# Patient Record
Sex: MALE | Race: WHITE | NOT HISPANIC OR LATINO
[De-identification: names, ages, dates, MRNs, and addresses within clinical notes are randomized per-mention and may not be internally consistent; named-entity substitution may affect disease eponyms.]

---

## 2018-04-22 ENCOUNTER — FORM ENCOUNTER (OUTPATIENT)
Age: 63
End: 2018-04-22

## 2018-04-23 ENCOUNTER — APPOINTMENT (OUTPATIENT)
Dept: MRI IMAGING | Facility: CLINIC | Age: 63
End: 2018-04-23
Payer: MEDICARE

## 2018-04-23 ENCOUNTER — APPOINTMENT (OUTPATIENT)
Dept: ULTRASOUND IMAGING | Facility: CLINIC | Age: 63
End: 2018-04-23
Payer: MEDICARE

## 2018-04-23 ENCOUNTER — APPOINTMENT (OUTPATIENT)
Dept: ORTHOPEDIC SURGERY | Facility: CLINIC | Age: 63
End: 2018-04-23
Payer: MEDICARE

## 2018-04-23 ENCOUNTER — OUTPATIENT (OUTPATIENT)
Dept: OUTPATIENT SERVICES | Facility: HOSPITAL | Age: 63
LOS: 1 days | End: 2018-04-23
Payer: MEDICARE

## 2018-04-23 VITALS
DIASTOLIC BLOOD PRESSURE: 84 MMHG | WEIGHT: 215 LBS | BODY MASS INDEX: 29.12 KG/M2 | SYSTOLIC BLOOD PRESSURE: 122 MMHG | HEART RATE: 78 BPM | HEIGHT: 72 IN

## 2018-04-23 DIAGNOSIS — Z80.9 FAMILY HISTORY OF MALIGNANT NEOPLASM, UNSPECIFIED: ICD-10-CM

## 2018-04-23 DIAGNOSIS — Z78.9 OTHER SPECIFIED HEALTH STATUS: ICD-10-CM

## 2018-04-23 DIAGNOSIS — Z86.39 PERSONAL HISTORY OF OTHER ENDOCRINE, NUTRITIONAL AND METABOLIC DISEASE: ICD-10-CM

## 2018-04-23 DIAGNOSIS — M25.572 PAIN IN LEFT ANKLE AND JOINTS OF LEFT FOOT: ICD-10-CM

## 2018-04-23 DIAGNOSIS — Z00.8 ENCOUNTER FOR OTHER GENERAL EXAMINATION: ICD-10-CM

## 2018-04-23 PROCEDURE — 99204 OFFICE O/P NEW MOD 45 MIN: CPT

## 2018-04-23 PROCEDURE — 73721 MRI JNT OF LWR EXTRE W/O DYE: CPT

## 2018-04-23 PROCEDURE — 93970 EXTREMITY STUDY: CPT

## 2018-04-23 PROCEDURE — 73610 X-RAY EXAM OF ANKLE: CPT | Mod: LT

## 2018-04-23 PROCEDURE — 93970 EXTREMITY STUDY: CPT | Mod: 26

## 2018-04-23 PROCEDURE — 73721 MRI JNT OF LWR EXTRE W/O DYE: CPT | Mod: 26,LT

## 2018-04-23 RX ORDER — GEMFIBROZIL 600 MG/1
600 TABLET, FILM COATED ORAL
Qty: 60 | Refills: 0 | Status: ACTIVE | COMMUNITY
Start: 2018-03-02

## 2018-04-23 RX ORDER — CEPHALEXIN 500 MG/1
500 CAPSULE ORAL 3 TIMES DAILY
Qty: 21 | Refills: 0 | Status: ACTIVE | COMMUNITY
Start: 2018-04-23 | End: 1900-01-01

## 2018-04-23 RX ORDER — OSELTAMIVIR PHOSPHATE 75 MG/1
75 CAPSULE ORAL
Qty: 10 | Refills: 0 | Status: ACTIVE | COMMUNITY
Start: 2017-12-17

## 2018-04-23 RX ORDER — LISINOPRIL AND HYDROCHLOROTHIAZIDE TABLETS 20; 12.5 MG/1; MG/1
20-12.5 TABLET ORAL
Qty: 30 | Refills: 0 | Status: ACTIVE | COMMUNITY
Start: 2018-03-03

## 2018-09-30 ENCOUNTER — EMERGENCY (EMERGENCY)
Facility: HOSPITAL | Age: 63
LOS: 0 days | Discharge: HOME | End: 2018-09-30
Attending: EMERGENCY MEDICINE | Admitting: EMERGENCY MEDICINE
Payer: MEDICARE

## 2018-09-30 VITALS
RESPIRATION RATE: 17 BRPM | OXYGEN SATURATION: 100 % | DIASTOLIC BLOOD PRESSURE: 92 MMHG | HEART RATE: 74 BPM | SYSTOLIC BLOOD PRESSURE: 165 MMHG

## 2018-09-30 VITALS — WEIGHT: 210.1 LBS | HEIGHT: 73 IN
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
## 2018-09-30 DIAGNOSIS — S63.501A UNSPECIFIED SPRAIN OF RIGHT WRIST, INITIAL ENCOUNTER: ICD-10-CM
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
## 2018-09-30 DIAGNOSIS — E78.5 HYPERLIPIDEMIA, UNSPECIFIED: ICD-10-CM

## 2018-09-30 DIAGNOSIS — I10 ESSENTIAL (PRIMARY) HYPERTENSION: ICD-10-CM

## 2018-09-30 DIAGNOSIS — L03.113 CELLULITIS OF RIGHT UPPER LIMB: ICD-10-CM

## 2018-09-30 LAB
ALBUMIN SERPL ELPH-MCNC: 3.2 G/DL — LOW (ref 3.3–5)
ALP SERPL-CCNC: 90 U/L — SIGNIFICANT CHANGE UP (ref 40–120)
ALT FLD-CCNC: 28 U/L — SIGNIFICANT CHANGE UP (ref 12–78)
ANION GAP SERPL CALC-SCNC: 8 MMOL/L — SIGNIFICANT CHANGE UP (ref 5–17)
APTT BLD: 31.4 SEC — SIGNIFICANT CHANGE UP (ref 27.5–37.4)
AST SERPL-CCNC: 33 U/L — SIGNIFICANT CHANGE UP (ref 15–37)
BASOPHILS # BLD AUTO: 0.03 K/UL — SIGNIFICANT CHANGE UP (ref 0–0.2)
BASOPHILS NFR BLD AUTO: 0.4 % — SIGNIFICANT CHANGE UP (ref 0–2)
BILIRUB SERPL-MCNC: 0.3 MG/DL — SIGNIFICANT CHANGE UP (ref 0.2–1.2)
BUN SERPL-MCNC: 14 MG/DL — SIGNIFICANT CHANGE UP (ref 7–23)
CALCIUM SERPL-MCNC: 8.7 MG/DL — SIGNIFICANT CHANGE UP (ref 8.5–10.1)
CHLORIDE SERPL-SCNC: 107 MMOL/L — SIGNIFICANT CHANGE UP (ref 96–108)
CO2 SERPL-SCNC: 25 MMOL/L — SIGNIFICANT CHANGE UP (ref 22–31)
CREAT SERPL-MCNC: 0.98 MG/DL — SIGNIFICANT CHANGE UP (ref 0.5–1.3)
EOSINOPHIL # BLD AUTO: 0.29 K/UL — SIGNIFICANT CHANGE UP (ref 0–0.5)
EOSINOPHIL NFR BLD AUTO: 3.5 % — SIGNIFICANT CHANGE UP (ref 0–6)
GLUCOSE SERPL-MCNC: 115 MG/DL — HIGH (ref 70–99)
HCT VFR BLD CALC: 41.6 % — SIGNIFICANT CHANGE UP (ref 39–50)
HGB BLD-MCNC: 14.5 G/DL — SIGNIFICANT CHANGE UP (ref 13–17)
IMM GRANULOCYTES NFR BLD AUTO: 0.7 % — SIGNIFICANT CHANGE UP (ref 0–1.5)
INR BLD: 1.03 RATIO — SIGNIFICANT CHANGE UP (ref 0.88–1.16)
LACTATE SERPL-SCNC: 1.6 MMOL/L — SIGNIFICANT CHANGE UP (ref 0.7–2)
LYMPHOCYTES # BLD AUTO: 1.13 K/UL — SIGNIFICANT CHANGE UP (ref 1–3.3)
LYMPHOCYTES # BLD AUTO: 13.5 % — SIGNIFICANT CHANGE UP (ref 13–44)
MCHC RBC-ENTMCNC: 33 PG — SIGNIFICANT CHANGE UP (ref 27–34)
MCHC RBC-ENTMCNC: 34.9 GM/DL — SIGNIFICANT CHANGE UP (ref 32–36)
MCV RBC AUTO: 94.8 FL — SIGNIFICANT CHANGE UP (ref 80–100)
MONOCYTES # BLD AUTO: 0.91 K/UL — HIGH (ref 0–0.9)
MONOCYTES NFR BLD AUTO: 10.9 % — SIGNIFICANT CHANGE UP (ref 2–14)
NEUTROPHILS # BLD AUTO: 5.92 K/UL — SIGNIFICANT CHANGE UP (ref 1.8–7.4)
NEUTROPHILS NFR BLD AUTO: 71 % — SIGNIFICANT CHANGE UP (ref 43–77)
NRBC # BLD: 0 /100 WBCS — SIGNIFICANT CHANGE UP (ref 0–0)
PLATELET # BLD AUTO: 242 K/UL — SIGNIFICANT CHANGE UP (ref 150–400)
POTASSIUM SERPL-MCNC: 4.2 MMOL/L — SIGNIFICANT CHANGE UP (ref 3.5–5.3)
POTASSIUM SERPL-SCNC: 4.2 MMOL/L — SIGNIFICANT CHANGE UP (ref 3.5–5.3)
PROT SERPL-MCNC: 7.2 GM/DL — SIGNIFICANT CHANGE UP (ref 6–8.3)
PROTHROM AB SERPL-ACNC: 11.1 SEC — SIGNIFICANT CHANGE UP (ref 9.8–12.7)
RBC # BLD: 4.39 M/UL — SIGNIFICANT CHANGE UP (ref 4.2–5.8)
RBC # FLD: 11.8 % — SIGNIFICANT CHANGE UP (ref 10.3–14.5)
SODIUM SERPL-SCNC: 140 MMOL/L — SIGNIFICANT CHANGE UP (ref 135–145)
URATE SERPL-MCNC: 8.7 MG/DL — SIGNIFICANT CHANGE UP (ref 3.4–8.8)
WBC # BLD: 8.34 K/UL — SIGNIFICANT CHANGE UP (ref 3.8–10.5)
WBC # FLD AUTO: 8.34 K/UL — SIGNIFICANT CHANGE UP (ref 3.8–10.5)

## 2018-09-30 PROCEDURE — 73110 X-RAY EXAM OF WRIST: CPT | Mod: 26,RT

## 2018-09-30 PROCEDURE — 99284 EMERGENCY DEPT VISIT MOD MDM: CPT

## 2018-09-30 RX ORDER — SODIUM CHLORIDE 9 MG/ML
1000 INJECTION INTRAMUSCULAR; INTRAVENOUS; SUBCUTANEOUS ONCE
Qty: 0 | Refills: 0 | Status: COMPLETED | OUTPATIENT
Start: 2018-09-30 | End: 2018-09-30

## 2018-09-30 RX ORDER — CEFAZOLIN SODIUM 1 G
1000 VIAL (EA) INJECTION ONCE
Qty: 0 | Refills: 0 | Status: COMPLETED | OUTPATIENT
Start: 2018-09-30 | End: 2018-09-30

## 2018-09-30 RX ORDER — CEPHALEXIN 500 MG
1 CAPSULE ORAL
Qty: 28 | Refills: 0 | OUTPATIENT
Start: 2018-09-30 | End: 2018-10-06

## 2018-09-30 RX ADMIN — SODIUM CHLORIDE 1000 MILLILITER(S): 9 INJECTION INTRAMUSCULAR; INTRAVENOUS; SUBCUTANEOUS at 08:55

## 2018-09-30 RX ADMIN — Medication 100 MILLIGRAM(S): at 08:58

## 2018-09-30 NOTE — ED ADULT NURSE NOTE - NSIMPLEMENTINTERV_GEN_ALL_ED
Implemented All Universal Safety Interventions:  [place] to call system. Call bell, personal items and telephone within reach. Instruct patient to call for assistance. Room bathroom lighting operational. Non-slip footwear when patient is off stretcher. Physically safe environment: no spills, clutter or unnecessary equipment. Stretcher in lowest position, wheels locked, appropriate side rails in place.

## 2018-09-30 NOTE — ED ADULT NURSE NOTE - OBJECTIVE STATEMENT
pt states right wrist pain, swelling and redness since yesterday worsening this morning. states he was lifting a garage door a few days prior and noticed increasing redness, swelling and pain since. no fever myalgia or discharge. denies break in skin.

## 2018-09-30 NOTE — ED PROVIDER NOTE - PROGRESS NOTE DETAILS
Attending Gonzalez, pt decline pain mediation at this time Attending Gonzalez, reviewed with pt and wife results of tests.  Plan d/c Attending Lisa, [facility] locate [facility]-precribe. Will make paper script for keflex.

## 2018-09-30 NOTE — ED PROVIDER NOTE - MEDICAL DECISION MAKING DETAILS
61 yo pt with injury to right wrist.  Pain and redness.  Possible sprain vs gout vs infection.   Plan xray and labs

## 2018-09-30 NOTE — ED PROVIDER NOTE - OBJECTIVE STATEMENT
63 yo pt presents with right wrist pain, redness and swelling.  PMH for htn, elevated cholesterol and gout.  Pt states on wed he lifted up heavy garage door.  Then on Thursday started with pain.  Now with continued pain and redness.  No fever, no travel, no sick contact.  Couple months ago pt with gout flair to right ankle and was given steroid shot.  No travel, no sick contacts.

## 2018-10-05 LAB
CULTURE RESULTS: SIGNIFICANT CHANGE UP
CULTURE RESULTS: SIGNIFICANT CHANGE UP
SPECIMEN SOURCE: SIGNIFICANT CHANGE UP
SPECIMEN SOURCE: SIGNIFICANT CHANGE UP

## 2018-11-26 NOTE — ED PROVIDER NOTE - CARE PLAN
Home Principal Discharge DX:	Wrist sprain, right, initial encounter  Secondary Diagnosis:	Cellulitis, unspecified cellulitis site

## 2018-12-12 ENCOUNTER — EMERGENCY (EMERGENCY)
Facility: HOSPITAL | Age: 63
LOS: 0 days | Discharge: HOME | End: 2018-12-13
Attending: EMERGENCY MEDICINE | Admitting: EMERGENCY MEDICINE
Payer: MEDICARE

## 2018-12-12 VITALS
HEART RATE: 87 BPM | RESPIRATION RATE: 18 BRPM | OXYGEN SATURATION: 97 % | DIASTOLIC BLOOD PRESSURE: 99 MMHG | WEIGHT: 205.91 LBS | SYSTOLIC BLOOD PRESSURE: 144 MMHG | HEIGHT: 73 IN | TEMPERATURE: 98 F

## 2018-12-12 DIAGNOSIS — R42 DIZZINESS AND GIDDINESS: ICD-10-CM
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
## 2018-12-12 DIAGNOSIS — F10.10 ALCOHOL ABUSE, UNCOMPLICATED: ICD-10-CM

## 2018-12-12 PROCEDURE — 99284 EMERGENCY DEPT VISIT MOD MDM: CPT | Mod: 25

## 2018-12-12 PROCEDURE — 70450 CT HEAD/BRAIN W/O DYE: CPT | Mod: 26
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
## 2018-12-12 NOTE — ED PROVIDER NOTE - PROGRESS NOTE DETAILS
2 negative cardiac enzymes, ct head negative dizziness likely due to alcohol intoxication will d/c janessa Paul DO

## 2018-12-12 NOTE — ED ADULT NURSE NOTE - NSIMPLEMENTINTERV_GEN_ALL_ED
Implemented All Universal Safety Interventions:  [place] to call system. Call bell, personal items and telephone within reach. Instruct patient to call for assistance. Room bathroom lighting operational. Non-slip footwear when patient is off stretcher. Physically safe environment: no spills, clutter or unnecessary equipment. Stretcher in lowest position, wheels locked, appropriate side rails in place.
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
## 2018-12-12 NOTE — ED ADULT NURSE NOTE - CHPI ED NUR SYMPTOMS NEG
no fever/no pain/no decreased eating/drinking/no tingling/no weakness/no chills/no nausea/no vomiting/no dizziness

## 2018-12-12 NOTE — ED PROVIDER NOTE - OBJECTIVE STATEMENT
64 yo male pw feeling more drunk than he usually feels when he drinks. he states he drinks daily. tonight he had a few drinks and felt dizzy and nausea, no vomiting. denies any chest pain, states he is feeling better now

## 2018-12-13 VITALS
RESPIRATION RATE: 16 BRPM | HEART RATE: 81 BPM | DIASTOLIC BLOOD PRESSURE: 78 MMHG | SYSTOLIC BLOOD PRESSURE: 139 MMHG | OXYGEN SATURATION: 99 % | TEMPERATURE: 98 F

## 2018-12-13 PROBLEM — I10 ESSENTIAL (PRIMARY) HYPERTENSION: Chronic | Status: ACTIVE | Noted: 2018-09-30

## 2018-12-13 PROBLEM — E78.00 PURE HYPERCHOLESTEROLEMIA, UNSPECIFIED: Chronic | Status: ACTIVE | Noted: 2018-09-30

## 2018-12-13 LAB
ADD ON TEST-SPECIMEN IN LAB: SIGNIFICANT CHANGE UP
ALBUMIN SERPL ELPH-MCNC: 3.1 G/DL — LOW (ref 3.3–5)
ALP SERPL-CCNC: 68 U/L — SIGNIFICANT CHANGE UP (ref 40–120)
ALT FLD-CCNC: 25 U/L — SIGNIFICANT CHANGE UP (ref 12–78)
ANION GAP SERPL CALC-SCNC: 10 MMOL/L — SIGNIFICANT CHANGE UP (ref 5–17)
AST SERPL-CCNC: 17 U/L — SIGNIFICANT CHANGE UP (ref 15–37)
BASOPHILS # BLD AUTO: 0.03 K/UL — SIGNIFICANT CHANGE UP (ref 0–0.2)
BASOPHILS NFR BLD AUTO: 0.4 % — SIGNIFICANT CHANGE UP (ref 0–2)
BILIRUB SERPL-MCNC: 0.3 MG/DL — SIGNIFICANT CHANGE UP (ref 0.2–1.2)
BUN SERPL-MCNC: 20 MG/DL — SIGNIFICANT CHANGE UP (ref 7–23)
CALCIUM SERPL-MCNC: 8.1 MG/DL — LOW (ref 8.5–10.1)
CHLORIDE SERPL-SCNC: 104 MMOL/L — SIGNIFICANT CHANGE UP (ref 96–108)
CO2 SERPL-SCNC: 26 MMOL/L — SIGNIFICANT CHANGE UP (ref 22–31)
CREAT SERPL-MCNC: 0.86 MG/DL — SIGNIFICANT CHANGE UP (ref 0.5–1.3)
EOSINOPHIL # BLD AUTO: 0.2 K/UL — SIGNIFICANT CHANGE UP (ref 0–0.5)
EOSINOPHIL NFR BLD AUTO: 2.6 % — SIGNIFICANT CHANGE UP (ref 0–6)
ETHANOL SERPL-MCNC: 141 MG/DL — HIGH (ref 0–10)
GLUCOSE SERPL-MCNC: 104 MG/DL — HIGH (ref 70–99)
HCT VFR BLD CALC: 40.8 % — SIGNIFICANT CHANGE UP (ref 39–50)
HGB BLD-MCNC: 14.3 G/DL — SIGNIFICANT CHANGE UP (ref 13–17)
IMM GRANULOCYTES NFR BLD AUTO: 2 % — HIGH (ref 0–1.5)
LYMPHOCYTES # BLD AUTO: 1.91 K/UL — SIGNIFICANT CHANGE UP (ref 1–3.3)
LYMPHOCYTES # BLD AUTO: 24.8 % — SIGNIFICANT CHANGE UP (ref 13–44)
MCHC RBC-ENTMCNC: 33.7 PG — SIGNIFICANT CHANGE UP (ref 27–34)
MCHC RBC-ENTMCNC: 35 GM/DL — SIGNIFICANT CHANGE UP (ref 32–36)
MCV RBC AUTO: 96.2 FL — SIGNIFICANT CHANGE UP (ref 80–100)
MONOCYTES # BLD AUTO: 0.7 K/UL — SIGNIFICANT CHANGE UP (ref 0–0.9)
MONOCYTES NFR BLD AUTO: 9.1 % — SIGNIFICANT CHANGE UP (ref 2–14)
NEUTROPHILS # BLD AUTO: 4.7 K/UL — SIGNIFICANT CHANGE UP (ref 1.8–7.4)
NEUTROPHILS NFR BLD AUTO: 61.1 % — SIGNIFICANT CHANGE UP (ref 43–77)
NRBC # BLD: 0 /100 WBCS — SIGNIFICANT CHANGE UP (ref 0–0)
PLATELET # BLD AUTO: 315 K/UL — SIGNIFICANT CHANGE UP (ref 150–400)
POTASSIUM SERPL-MCNC: 3.6 MMOL/L — SIGNIFICANT CHANGE UP (ref 3.5–5.3)
POTASSIUM SERPL-SCNC: 3.6 MMOL/L — SIGNIFICANT CHANGE UP (ref 3.5–5.3)
PROT SERPL-MCNC: 6.8 GM/DL — SIGNIFICANT CHANGE UP (ref 6–8.3)
RBC # BLD: 4.24 M/UL — SIGNIFICANT CHANGE UP (ref 4.2–5.8)
RBC # FLD: 12.6 % — SIGNIFICANT CHANGE UP (ref 10.3–14.5)
SODIUM SERPL-SCNC: 140 MMOL/L — SIGNIFICANT CHANGE UP (ref 135–145)
TROPONIN I SERPL-MCNC: <0.015 NG/ML — SIGNIFICANT CHANGE UP (ref 0.01–0.04)
WBC # BLD: 7.69 K/UL — SIGNIFICANT CHANGE UP (ref 3.8–10.5)
WBC # FLD AUTO: 7.69 K/UL — SIGNIFICANT CHANGE UP (ref 3.8–10.5)

## 2018-12-13 PROCEDURE — 93010 ELECTROCARDIOGRAM REPORT: CPT

## 2019-01-26 ENCOUNTER — EMERGENCY (EMERGENCY)
Facility: HOSPITAL | Age: 64
LOS: 0 days | Discharge: HOME | End: 2019-01-26
Attending: EMERGENCY MEDICINE | Admitting: EMERGENCY MEDICINE
Payer: MEDICARE

## 2019-01-26 VITALS
SYSTOLIC BLOOD PRESSURE: 136 MMHG | RESPIRATION RATE: 19 BRPM | HEART RATE: 72 BPM | OXYGEN SATURATION: 98 % | DIASTOLIC BLOOD PRESSURE: 80 MMHG

## 2019-01-26 VITALS — HEIGHT: 73 IN | WEIGHT: 205.03 LBS

## 2019-01-26 DIAGNOSIS — E78.00 PURE HYPERCHOLESTEROLEMIA, UNSPECIFIED: ICD-10-CM

## 2019-01-26 DIAGNOSIS — E78.5 HYPERLIPIDEMIA, UNSPECIFIED: ICD-10-CM

## 2019-01-26 DIAGNOSIS — M79.662 PAIN IN LEFT LOWER LEG: ICD-10-CM

## 2019-01-26 DIAGNOSIS — M10.9 GOUT, UNSPECIFIED: ICD-10-CM

## 2019-01-26 DIAGNOSIS — Z79.2 LONG TERM (CURRENT) USE OF ANTIBIOTICS: ICD-10-CM

## 2019-01-26 DIAGNOSIS — I10 ESSENTIAL (PRIMARY) HYPERTENSION: ICD-10-CM

## 2019-01-26 DIAGNOSIS — M25.462 EFFUSION, LEFT KNEE: ICD-10-CM

## 2019-01-26 PROCEDURE — 99283 EMERGENCY DEPT VISIT LOW MDM: CPT | Mod: 25

## 2019-01-26 PROCEDURE — 93971 EXTREMITY STUDY: CPT | Mod: 26,LT

## 2019-01-26 PROCEDURE — 73564 X-RAY EXAM KNEE 4 OR MORE: CPT | Mod: 26,LT

## 2019-01-26 RX ORDER — IBUPROFEN 200 MG
1 TABLET ORAL
Qty: 15 | Refills: 0 | OUTPATIENT
Start: 2019-01-26

## 2019-01-26 RX ORDER — IBUPROFEN 200 MG
600 TABLET ORAL ONCE
Qty: 0 | Refills: 0 | Status: COMPLETED | OUTPATIENT
Start: 2019-01-26 | End: 2019-01-26

## 2019-01-26 RX ADMIN — Medication 600 MILLIGRAM(S): at 13:11

## 2019-01-26 NOTE — ED ADULT TRIAGE NOTE - CHIEF COMPLAINT QUOTE
patient reports lle pain that started today, denies erythema, warmth or swelling.  did travel in car 2 days ago approx 4 hours, is not on any anticoagulants.  patient is on allopurinol for gout flare one month ago.  gout was in right hand.  patient took tylenol at 930 am, which did not help with pain

## 2019-01-26 NOTE — ED STATDOCS - OBJECTIVE STATEMENT
62 y/o male with a PMHx of HLD, HTN, Gout in the hand on allopurinol presents to the ED c/o LLE pain today. Pt states he woke up today with some pain in his LLE today and took Tylenol without relief. Pt states pain is worse when he walks, mostly around his knee. Reports a recent drive 2 days ago for 4 hours. Family at bedside states the patient is constantly driving for long periods of time all day. Reports one month ago he may have hit his knee on a rock but does not believe it is related to the pain he is having today. PMD: Dr. Larsen.

## 2019-01-26 NOTE — ED ADULT NURSE NOTE - NSIMPLEMENTINTERV_GEN_ALL_ED
Implemented All Fall with Harm Risk Interventions:  [place] to call system. Call bell, personal items and telephone within reach. Instruct patient to call for assistance. Room bathroom lighting operational. Non-slip footwear when patient is off stretcher. Physically safe environment: no spills, clutter or unnecessary equipment. Stretcher in lowest position, wheels locked, appropriate side rails in place. Provide visual cue, wrist band, yellow gown, etc. Monitor gait and stability. Monitor for mental status changes and reorient to person, place, and time. Review medications for side effects contributing to fall risk. Reinforce activity limits and safety measures with patient and family. Provide visual clues: red socks.

## 2019-01-26 NOTE — ED STATDOCS - PROGRESS NOTE DETAILS
signed Lori Holguin PA-C Pt seen initially in intake by Dr Dyer.   63M c/o left LE (though points only to knee and immediately surrounding area) pain starting this morning. Denies trauma, fever, or injury. Pt recently started on allopurinol for gout in his right hand 1 month ago. Pt notes his knee will be OK until he walks for a bit then it starts hurting worse and worse. no effusion or erythema. extensor mechanism intact. no ACL/PCL/LCL/MCL laxity or pain with stress. No TTP. Pt able to ambulate in ED unassisted with only slight limp. Plan xray, sono, advil. Pt agrees with plan of  care. signed Lori Holguin PA-C   No significant findings on xray or sono. Likely arthritis, pt has full AROM of knee. Advise RICE, motrin, f.u ortho- pt sees Dr Medina. return precautions given. Pt feeling well, pt and family agree with DC and plan of care.

## 2019-01-26 NOTE — ED STATDOCS - ATTENDING CONTRIBUTION TO CARE
I, Rupert Dyer MD,  performed the initial face to face bedside interview with this patient regarding history of present illness, review of symptoms and relevant past medical, social and family history.  I completed an independent physical examination.  I was the initial provider who evaluated this patient. I have signed out the follow up of any pending tests (i.e. labs, radiological studies) to the ACP.  I have communicated the patient’s plan of care and disposition with the ACP.

## 2019-01-26 NOTE — ED STATDOCS - NS_ ATTENDINGSCRIBEDETAILS _ED_A_ED_FT
I, Rupert Dyer MD,  performed the initial face to face bedside interview with this patient regarding history of present illness, review of symptoms and relevant past medical, social and family history.  I completed an independent physical examination.    The history, relevant review of systems, past medical and surgical history, medical decision making, and physical examination was documented by the scribe in my presence and I attest to the accuracy of the documentation.

## 2019-01-26 NOTE — ED ADULT NURSE NOTE - OBJECTIVE STATEMENT
pt presents to ED c/o LLE 6/10 that began today. pt reports recent travel in car, not on blood thinners. hx gout w/ recent gout flare up to R hand. last took tylenol at 930 AM. able to ambulate.

## 2019-10-29 NOTE — ED PROVIDER NOTE - CPE EDP ENMT NORM
Pt sedated with 1.5mg Versed and 25mcg Fentanyl for WYATT (monitored sedation from 1318 to 1343) normal...

## 2020-09-22 ENCOUNTER — TRANSCRIPTION ENCOUNTER (OUTPATIENT)
Age: 65
End: 2020-09-22

## 2021-05-14 ENCOUNTER — APPOINTMENT (OUTPATIENT)
Dept: MRI IMAGING | Facility: CLINIC | Age: 66
End: 2021-05-14
Payer: MEDICARE

## 2021-05-14 ENCOUNTER — OUTPATIENT (OUTPATIENT)
Dept: OUTPATIENT SERVICES | Facility: HOSPITAL | Age: 66
LOS: 1 days | End: 2021-05-14
Payer: MEDICARE

## 2021-05-14 DIAGNOSIS — Z00.8 ENCOUNTER FOR OTHER GENERAL EXAMINATION: ICD-10-CM

## 2021-05-14 PROCEDURE — 72148 MRI LUMBAR SPINE W/O DYE: CPT

## 2021-05-14 PROCEDURE — 72148 MRI LUMBAR SPINE W/O DYE: CPT | Mod: 26,MH

## 2021-06-14 ENCOUNTER — OUTPATIENT (OUTPATIENT)
Dept: OUTPATIENT SERVICES | Facility: HOSPITAL | Age: 66
LOS: 1 days | End: 2021-06-14
Payer: MEDICARE

## 2021-06-14 ENCOUNTER — APPOINTMENT (OUTPATIENT)
Dept: CT IMAGING | Facility: CLINIC | Age: 66
End: 2021-06-14
Payer: MEDICARE

## 2021-06-14 DIAGNOSIS — Z00.8 ENCOUNTER FOR OTHER GENERAL EXAMINATION: ICD-10-CM

## 2021-06-14 PROCEDURE — 74177 CT ABD & PELVIS W/CONTRAST: CPT

## 2021-06-14 PROCEDURE — 74177 CT ABD & PELVIS W/CONTRAST: CPT | Mod: 26,MH

## 2021-06-14 PROCEDURE — 82565 ASSAY OF CREATININE: CPT

## 2022-04-16 ENCOUNTER — TRANSCRIPTION ENCOUNTER (OUTPATIENT)
Age: 67
End: 2022-04-16

## 2022-04-28 ENCOUNTER — APPOINTMENT (OUTPATIENT)
Dept: ORTHOPEDIC SURGERY | Facility: CLINIC | Age: 67
End: 2022-04-28
Payer: MEDICARE

## 2022-04-28 ENCOUNTER — NON-APPOINTMENT (OUTPATIENT)
Age: 67
End: 2022-04-28

## 2022-04-28 DIAGNOSIS — M77.42 METATARSALGIA, LEFT FOOT: ICD-10-CM

## 2022-04-28 DIAGNOSIS — D22.9 MELANOCYTIC NEVI, UNSPECIFIED: ICD-10-CM

## 2022-04-28 DIAGNOSIS — M79.672 PAIN IN LEFT FOOT: ICD-10-CM

## 2022-04-28 PROCEDURE — 73630 X-RAY EXAM OF FOOT: CPT | Mod: 26,LT

## 2022-04-28 PROCEDURE — 99204 OFFICE O/P NEW MOD 45 MIN: CPT

## 2022-04-28 NOTE — ADDENDUM
[FreeTextEntry1] : I, Марина Hardik, acted solely as a scribe for Reji Flores PA-C on this date 04/28/2022.\par \par All medical record entries made by the Scribe were at my, Reji Flores PA-C, direction and personally dictated by me on 04/28/2022  . I have reviewed the chart and agree that the record accurately reflects my personal performance of the history, physical exam, assessment and plan. I have also personally directed, reviewed, and agreed with the chart.	\par

## 2022-04-28 NOTE — PHYSICAL EXAM
[de-identified] : Left Foot Exam\par \par General: Alert and oriented x3. In no acute distress. Pleasant in nature with a normal affect. No apparent respiratory distress.\par Erythema, Warmth, Rubor: Negative\par Swelling: Negative\par On the anterior shin there is erythema around the scab. The scab measures 1 inch in length by 0.5 inches.  No signs of infection or cellulitis\par \par ROM Ankle: Pronation of both ankles. \par 1. Dorsiflexion: 10 degrees\par 2. Plantarflexion: 40 degrees\par 3. Inversion: 20 degrees\par 4. Eversion: 10 degrees\par \par ROM of digits: Normal\par \par Pes Planus: Negative\par Pes Cavus: Negative\par \par Bunion: Negative\par Belen’s Bunion (Bunionette): Negative\par \par Hammer Toe Deformity/Deformities: Negative\par \par Tenderness to Palpation:\par 1. Heel Pain: Negative\par 2. Midfoot Pain: Negative\par 3. First MTP Joint: Negative\par 4. Lis Franc Joint: Negative\par \par Tenderness Metatarsals:\par 1st MT: Negative\par 2nd MT: Negative\par 3rd MT: Pain over the third metatarsal midshaft. \par 4th MT: Negative\par 5th MT: Negative\par Base of the 5th MT: Negative\par \par Ligament Pain:\par 1. Lis Franc Ligament: Negative\par 2. Plantar Fascia Ligament: Negative\par \par Strength:\par 5/5 TA/GS/EHL/FHL/EDL/ADD/ABD\par \par Pulses: 2+ DP/PT Pulses\par \par Capillary Refill Toes: <2 seconds\par \par Neuro: Intact motor and sensory throughout\par \par Additional Test:\par 1. Brewer's Squeeze Test: Negative\par 2. Calcaneal Squeeze Test: Negative\par \par Black "mole" with asymmetrical borders dorsal aspect of the foot, ~[id number]. [de-identified] : 3V of the left foot were ordered, obtained, and reviewed by me today, 04/28/2022, revealed: Hallux Rigidus. Heel spurs noted. Bone spur coming off the distal first metatarsal just proximal to the first MTPJ. Calcifications of the vessels to the anterior ankle noted on lateral views x-ray.

## 2022-04-28 NOTE — DISCUSSION/SUMMARY
[de-identified] : Today I had a lengthy discussion with the patient regarding their left foot pain. I have addressed all the patient's concerns surrounding the pathology of their condition. XR imaging was completed in office today and results were reviewed with the patient. At this time I would like to obtain advanced imaging of the patient's left foot. An MRI was ordered so I can find out more about the etiology of the patient's condition. \par \par In the interim, I recommended that the patient utilize a CAM boot. The patient was fitted for the CAM boot in the office today. The patient was educated about the boot wear pattern and utilization, as well as the timeframe to come out of the boot. He was also given full instructions for using the boot. He does not need to wear the boot when at rest and when sleeping. I recommend that the patient utilize meloxicam with food once per day as instructed. A prescription for the meloxicam was ordered for the patient in the office today. He may utilize ice and heat PRN. They can also elevate their left foot above the level of the heart. We discussed possible utilization of over the counter Dr. Novak's gel work orthotics once the patient transitions out of the CAM boot. 		\par \par For the small mole on the dorsal aspect of his foot, I recommended that he follow up with dermatology for further evaluation given his clinical examination. The patient understood and verbally agreed to the treatment plan. All of their questions were answered and they were satisfied with the visit. The patient should call the office if they have any questions or experience worsening symptoms. The patient should follow up with the office via Telehealth after obtaining the MRI.

## 2022-04-28 NOTE — HISTORY OF PRESENT ILLNESS
[FreeTextEntry1] : The patient is a 66 year old male presenting for an initial evaluation of left foot pain x one month. The patient reports that he has tried a pair of half-custom hard orthotics as recommended by a Chiropractor for one day. He reports that he maintained his normal routine for that day, however at the end of day, he noted an onset of severe, sharp pain to the front of his left foot. He is here today due to continual pain that has not improved over the past month, with a constant timing. He works long hours standing and walking, reporting sharp pain with walking. The patient states that he is unable to walk due to pain in his left foot. He is currently working, however he states that he took a few days off due to left foot pain. For pain, he takes Advil PRN. Of note, the patient does have an abrasion injury present on his anterior shin due to stepping off a curb. He presents today wearing sneakers and is walking without assistance. No other complaints.

## 2022-05-10 ENCOUNTER — NON-APPOINTMENT (OUTPATIENT)
Age: 67
End: 2022-05-10

## 2022-05-11 ENCOUNTER — TRANSCRIPTION ENCOUNTER (OUTPATIENT)
Age: 67
End: 2022-05-11

## 2022-05-12 ENCOUNTER — OUTPATIENT (OUTPATIENT)
Dept: OUTPATIENT SERVICES | Facility: HOSPITAL | Age: 67
LOS: 1 days | End: 2022-05-12

## 2022-05-12 ENCOUNTER — APPOINTMENT (OUTPATIENT)
Dept: DISASTER EMERGENCY | Facility: HOSPITAL | Age: 67
End: 2022-05-12

## 2022-05-12 VITALS
HEART RATE: 92 BPM | HEIGHT: 76 IN | RESPIRATION RATE: 18 BRPM | WEIGHT: 194.01 LBS | OXYGEN SATURATION: 97 % | SYSTOLIC BLOOD PRESSURE: 135 MMHG | DIASTOLIC BLOOD PRESSURE: 82 MMHG | TEMPERATURE: 99 F

## 2022-05-12 VITALS — TEMPERATURE: 101 F

## 2022-05-12 DIAGNOSIS — U07.1 COVID-19: ICD-10-CM

## 2022-05-12 RX ORDER — BEBTELOVIMAB 87.5 MG/ML
175 INJECTION, SOLUTION INTRAVENOUS ONCE
Refills: 0 | Status: COMPLETED | OUTPATIENT
Start: 2022-05-12 | End: 2022-05-12

## 2022-05-12 RX ORDER — ACETAMINOPHEN 500 MG
650 TABLET ORAL ONCE
Refills: 0 | Status: COMPLETED | OUTPATIENT
Start: 2022-05-12 | End: 2022-05-12

## 2022-05-12 RX ADMIN — BEBTELOVIMAB 175 MILLIGRAM(S): 87.5 INJECTION, SOLUTION INTRAVENOUS at 11:43

## 2022-05-12 RX ADMIN — Medication 650 MILLIGRAM(S): at 12:58

## 2022-05-12 NOTE — CHART NOTE - NSCHARTNOTEFT_GEN_A_CORE
CC: Monoclonal Antibody Infusion/COVID 19 Positive  66yMale with PMHx HTN, HLD and symptoms cough, congestion malaise    exam/findings:  T(C): --  HR: 92 (05-12-22 @ 11:35) (92 - 92)  BP: 135/82 (05-12-22 @ 11:35) (135/82 - 135/82)  RR: 18 (05-12-22 @ 11:35) (18 - 18)  SpO2: 97% (05-12-22 @ 11:35) (97% - 97%)      PE:   Appearance: NAD	  HEENT:   Normal cephalic atraumatic  Lymphatic: No lymphadenopathy  Respiratory: equal rise and fall of chest	  Skin: warm and dry  Neurologic: Non-focal  Extremities: Normal range of motion    ASSESSMENT:  Pt is a 66y with PMHx of HTN/HLD, Covid 19 Positive with symptoms in the last 7 days, who presents to infusion center for Monoclonal antibody infusion Bebtelovimab  Symptoms/ Criteria: cough congestion malaise  Risk Profile includes: Age >65, CV dz    PLAN:  - infusion procedure explained to patient   -Consent for monoclonal antibody infusion obtained   -Risk & benefits discussed/all questions answered  -Bebtelovimab  175mg IV push   -observe patient for one hour post infusion      I have reviewed the Bebtelovimab Emergency Use Authorization (EAU) and I have provided the patient or patient's caregiver with the following information:  1. FDA has authorized emergency use of Bebtelovimab , which is not FDA-approved biologic product.  2. The patient or patient's caregiver has the option to accept or refuse administration of Bebtelovimab  3. The significant known and benefits are unknown.  4. Information on available alternative treatments and risks and benefits of those alternatives.    Discharge:  Patient tolerated infusion well denies complaints of chest pain/SOB/dizziness/palpitations  Vital signs stable  D/C instructions given/ fact sheet included.  Patient to follow-up with PCP as needed.

## 2022-05-19 ENCOUNTER — APPOINTMENT (OUTPATIENT)
Dept: ORTHOPEDIC SURGERY | Facility: CLINIC | Age: 67
End: 2022-05-19

## 2022-06-10 ENCOUNTER — RX RENEWAL (OUTPATIENT)
Age: 67
End: 2022-06-10

## 2022-06-10 RX ORDER — MELOXICAM 15 MG/1
15 TABLET ORAL DAILY
Qty: 30 | Refills: 0 | Status: ACTIVE | COMMUNITY
Start: 2022-04-28 | End: 1900-01-01

## 2023-09-10 ENCOUNTER — EMERGENCY (EMERGENCY)
Facility: HOSPITAL | Age: 68
LOS: 0 days | Discharge: HOME | End: 2023-09-10
Attending: STUDENT IN AN ORGANIZED HEALTH CARE EDUCATION/TRAINING PROGRAM
Payer: MEDICARE

## 2023-09-10 VITALS
HEART RATE: 60 BPM | SYSTOLIC BLOOD PRESSURE: 134 MMHG | DIASTOLIC BLOOD PRESSURE: 84 MMHG | OXYGEN SATURATION: 95 % | WEIGHT: 190.04 LBS | TEMPERATURE: 98 F | HEIGHT: 72 IN | RESPIRATION RATE: 18 BRPM

## 2023-09-10 VITALS
HEART RATE: 65 BPM | OXYGEN SATURATION: 100 % | RESPIRATION RATE: 16 BRPM | TEMPERATURE: 98 F | DIASTOLIC BLOOD PRESSURE: 82 MMHG | SYSTOLIC BLOOD PRESSURE: 154 MMHG

## 2023-09-10 DIAGNOSIS — R42 DIZZINESS AND GIDDINESS: ICD-10-CM

## 2023-09-10 DIAGNOSIS — M48.00 SPINAL STENOSIS, SITE UNSPECIFIED: ICD-10-CM

## 2023-09-10 DIAGNOSIS — I10 ESSENTIAL (PRIMARY) HYPERTENSION: ICD-10-CM

## 2023-09-10 DIAGNOSIS — I45.10 UNSPECIFIED RIGHT BUNDLE-BRANCH BLOCK: ICD-10-CM

## 2023-09-10 DIAGNOSIS — E78.00 PURE HYPERCHOLESTEROLEMIA, UNSPECIFIED: ICD-10-CM

## 2023-09-10 LAB
ALBUMIN SERPL ELPH-MCNC: 3.5 G/DL — SIGNIFICANT CHANGE UP (ref 3.3–5)
ALP SERPL-CCNC: 66 U/L — SIGNIFICANT CHANGE UP (ref 40–120)
ALT FLD-CCNC: 22 U/L — SIGNIFICANT CHANGE UP (ref 12–78)
ANION GAP SERPL CALC-SCNC: 3 MMOL/L — LOW (ref 5–17)
AST SERPL-CCNC: 26 U/L — SIGNIFICANT CHANGE UP (ref 15–37)
BASOPHILS # BLD AUTO: 0.05 K/UL — SIGNIFICANT CHANGE UP (ref 0–0.2)
BASOPHILS NFR BLD AUTO: 0.8 % — SIGNIFICANT CHANGE UP (ref 0–2)
BILIRUB SERPL-MCNC: 0.4 MG/DL — SIGNIFICANT CHANGE UP (ref 0.2–1.2)
BUN SERPL-MCNC: 18 MG/DL — SIGNIFICANT CHANGE UP (ref 7–23)
CALCIUM SERPL-MCNC: 8.5 MG/DL — SIGNIFICANT CHANGE UP (ref 8.5–10.1)
CHLORIDE SERPL-SCNC: 112 MMOL/L — HIGH (ref 96–108)
CO2 SERPL-SCNC: 27 MMOL/L — SIGNIFICANT CHANGE UP (ref 22–31)
CREAT SERPL-MCNC: 0.99 MG/DL — SIGNIFICANT CHANGE UP (ref 0.5–1.3)
EGFR: 83 ML/MIN/1.73M2 — SIGNIFICANT CHANGE UP
EOSINOPHIL # BLD AUTO: 0.48 K/UL — SIGNIFICANT CHANGE UP (ref 0–0.5)
EOSINOPHIL NFR BLD AUTO: 7.4 % — HIGH (ref 0–6)
GLUCOSE SERPL-MCNC: 95 MG/DL — SIGNIFICANT CHANGE UP (ref 70–99)
HCT VFR BLD CALC: 41.5 % — SIGNIFICANT CHANGE UP (ref 39–50)
HGB BLD-MCNC: 14.3 G/DL — SIGNIFICANT CHANGE UP (ref 13–17)
IMM GRANULOCYTES NFR BLD AUTO: 0.3 % — SIGNIFICANT CHANGE UP (ref 0–0.9)
LYMPHOCYTES # BLD AUTO: 1.18 K/UL — SIGNIFICANT CHANGE UP (ref 1–3.3)
LYMPHOCYTES # BLD AUTO: 18.2 % — SIGNIFICANT CHANGE UP (ref 13–44)
MCHC RBC-ENTMCNC: 33.1 PG — SIGNIFICANT CHANGE UP (ref 27–34)
MCHC RBC-ENTMCNC: 34.5 GM/DL — SIGNIFICANT CHANGE UP (ref 32–36)
MCV RBC AUTO: 96.1 FL — SIGNIFICANT CHANGE UP (ref 80–100)
MONOCYTES # BLD AUTO: 0.73 K/UL — SIGNIFICANT CHANGE UP (ref 0–0.9)
MONOCYTES NFR BLD AUTO: 11.3 % — SIGNIFICANT CHANGE UP (ref 2–14)
NEUTROPHILS # BLD AUTO: 4.01 K/UL — SIGNIFICANT CHANGE UP (ref 1.8–7.4)
NEUTROPHILS NFR BLD AUTO: 62 % — SIGNIFICANT CHANGE UP (ref 43–77)
NT-PROBNP SERPL-SCNC: 43 PG/ML — SIGNIFICANT CHANGE UP (ref 0–125)
PLATELET # BLD AUTO: 269 K/UL — SIGNIFICANT CHANGE UP (ref 150–400)
POTASSIUM SERPL-MCNC: 4.3 MMOL/L — SIGNIFICANT CHANGE UP (ref 3.5–5.3)
POTASSIUM SERPL-SCNC: 4.3 MMOL/L — SIGNIFICANT CHANGE UP (ref 3.5–5.3)
PROT SERPL-MCNC: 6.4 GM/DL — SIGNIFICANT CHANGE UP (ref 6–8.3)
RBC # BLD: 4.32 M/UL — SIGNIFICANT CHANGE UP (ref 4.2–5.8)
RBC # FLD: 13.3 % — SIGNIFICANT CHANGE UP (ref 10.3–14.5)
SODIUM SERPL-SCNC: 142 MMOL/L — SIGNIFICANT CHANGE UP (ref 135–145)
TROPONIN I, HIGH SENSITIVITY RESULT: 10.03 NG/L — SIGNIFICANT CHANGE UP
WBC # BLD: 6.47 K/UL — SIGNIFICANT CHANGE UP (ref 3.8–10.5)
WBC # FLD AUTO: 6.47 K/UL — SIGNIFICANT CHANGE UP (ref 3.8–10.5)

## 2023-09-10 PROCEDURE — 99285 EMERGENCY DEPT VISIT HI MDM: CPT | Mod: 25

## 2023-09-10 PROCEDURE — 93010 ELECTROCARDIOGRAM REPORT: CPT

## 2023-09-10 PROCEDURE — 70450 CT HEAD/BRAIN W/O DYE: CPT | Mod: 26,MA

## 2023-09-10 PROCEDURE — 85025 COMPLETE CBC W/AUTO DIFF WBC: CPT

## 2023-09-10 PROCEDURE — 99285 EMERGENCY DEPT VISIT HI MDM: CPT

## 2023-09-10 PROCEDURE — 36415 COLL VENOUS BLD VENIPUNCTURE: CPT

## 2023-09-10 PROCEDURE — 70450 CT HEAD/BRAIN W/O DYE: CPT | Mod: MA

## 2023-09-10 PROCEDURE — 80053 COMPREHEN METABOLIC PANEL: CPT

## 2023-09-10 PROCEDURE — 83880 ASSAY OF NATRIURETIC PEPTIDE: CPT

## 2023-09-10 PROCEDURE — 93005 ELECTROCARDIOGRAM TRACING: CPT

## 2023-09-10 PROCEDURE — 71045 X-RAY EXAM CHEST 1 VIEW: CPT | Mod: 26

## 2023-09-10 PROCEDURE — 71045 X-RAY EXAM CHEST 1 VIEW: CPT

## 2023-09-10 PROCEDURE — 84484 ASSAY OF TROPONIN QUANT: CPT
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
## 2023-09-10 NOTE — ED PROVIDER NOTE - CLINICAL SUMMARY MEDICAL DECISION MAKING FREE TEXT BOX
66 y/o male here for episode of lightheadedness this morning. reported not eating or drinking anything prior to the episode. no neuro deficit. symptoms resolved at present. will r/o any intracranial hemorrhage vs electrolyte abnormality vs hypoglycemia vs ACS vs arrhythmia's. Plan to do labs, ekg, imaging. meds and reassess.

## 2023-09-10 NOTE — ED ADULT TRIAGE NOTE - HEIGHT IN FEET
Problem: Patient Care Overview (Adult)  Goal: Plan of Care Review  Outcome: Ongoing (interventions implemented as appropriate)    08/04/17 9104   Coping/Psychosocial Response Interventions   Plan Of Care Reviewed With patient;daughter   Outcome Evaluation   Outcome Summary/Follow up Plan continue to monitor hgb.        Goal: Adult Individualization and Mutuality  Outcome: Ongoing (interventions implemented as appropriate)  Goal: Discharge Needs Assessment  Outcome: Ongoing (interventions implemented as appropriate)    Problem: Respiratory Insufficiency (Adult)  Goal: Identify Related Risk Factors and Signs and Symptoms  Outcome: Ongoing (interventions implemented as appropriate)  Goal: Effective Ventilation  Outcome: Ongoing (interventions implemented as appropriate)       6

## 2023-09-10 NOTE — ED PROVIDER NOTE - PATIENT PORTAL LINK FT
You can access the FollowMyHealth Patient Portal offered by [facility] by registering at the following website: [email or web address]. By joining [employer]’s FollowMyHealth portal, you will also be able to view your health information using other applications (apps) compatible with our system.

## 2023-09-10 NOTE — ED PROVIDER NOTE - NS_ ATTENDINGSCRIBEDETAILS _ED_A_ED_FT
I, Monico Verdin DO,  performed the initial face to face bedside interview with this patient regarding history of present illness, review of symptoms and relevant past medical, social and family history.  I completed an independent physical examination.  I was the initial provider who evaluated this patient.   I personally saw the patient and performed a substantive portion of the visit including all aspects of the medical decision making.  The history, relevant review of systems, past medical and surgical history, medical decision making, and physical examination was documented by the scribe in my presence and I attest to the accuracy of the documentation.

## 2023-09-10 NOTE — ED PROVIDER NOTE - NS ED ROS FT
General: No fever, no nausea, no vomiting  HEENT: No loc, no ha, no dizziness  Respiratory: no trouble breathing  Cardiovascular: No chest pain  GI: No abdominal pain, no diarrhea  : No urinary complaints  Endocrine: no generalized weakness  Neurological: No weakness, numbness +lightheadedness  MSK: no recent trauma

## 2023-09-10 NOTE — ED PROVIDER NOTE - NSFOLLOWUPINSTRUCTIONS_ED_ALL_ED_FT
Patient advised to follow up with PMD for follow up in 3-4 days.  patient and his wife understands and agrees with plan.  Return to ER if symptoms worsens or develop new symptoms.       Dizziness  Dizziness is a common problem. It is a feeling of unsteadiness or light-headedness. You may feel like you are about to faint. Dizziness can lead to injury if you stumble or fall. Anyone can become dizzy, but dizziness is more common in older adults. This condition can be caused by a number of things, including medicines, dehydration, or illness.    Follow these instructions at home:  Eating and drinking    A comparison of three sample cups showing dark yellow, yellow, and pale yellow urine.  Drink enough fluid to keep your urine pale yellow. This helps to keep you from becoming dehydrated. Try to drink more clear fluids, such as water.  Do not drink alcohol.  Limit your caffeine intake if told to do so by your health care provider. Check ingredients and nutrition facts to see if a food or beverage contains caffeine.  Limit your salt (sodium) intake if told to do so by your health care provider. Check ingredients and nutrition facts to see if a food or beverage contains sodium.  Activity    A sign showing that a person should not drive.  Avoid making quick movements.  Rise slowly from chairs and steady yourself until you feel okay.  In the morning, first sit up on the side of the bed. When you feel okay, stand slowly while you hold onto something until you know that your balance is good.  If you need to  one place for a long time, move your legs often. Tighten and relax the muscles in your legs while you are standing.  Do not drive or use machinery if you feel dizzy.  Avoid bending down if you feel dizzy. Place items in your home so that they are easy for you to reach without leaning over.  Lifestyle    Do not use any products that contain nicotine or tobacco. These products include cigarettes, chewing tobacco, and vaping devices, such as e-cigarettes. If you need help quitting, ask your health care provider.  Try to reduce your stress level by using methods such as yoga or meditation. Talk with your health care provider if you need help to manage your stress.  General instructions    Watch your dizziness for any changes.  Take over-the-counter and prescription medicines only as told by your health care provider. Talk with your health care provider if you think that your dizziness is caused by a medicine that you are taking.  Tell a friend or a family member that you are feeling dizzy. If he or she notices any changes in your behavior, have this person call your health care provider.  Keep all follow-up visits. This is important.  Contact a health care provider if:  Your dizziness does not go away or you have new symptoms.  Your dizziness or light-headedness gets worse.  You feel nauseous.  You have reduced hearing.  You have a fever.  You have neck pain or a stiff neck.  Your dizziness leads to an injury or a fall.  Get help right away if:  You vomit or have diarrhea and are unable to eat or drink anything.  You have problems talking, walking, swallowing, or using your arms, hands, or legs.  You feel generally weak.  You have any bleeding.  You are not thinking clearly or you have trouble forming sentences. It may take a friend or family member to notice this.  You have chest pain, abdominal pain, shortness of breath, or sweating.  Your vision changes or you develop a severe headache.  These symptoms may represent a serious problem that is an emergency. Do not wait to see if the symptoms will go away. Get medical help right away. Call your local emergency services (911 in the [place] Do not drive yourself to the hospital.    Summary  Dizziness is a feeling of unsteadiness or light-headedness. This condition can be caused by a number of things, including medicines, dehydration, or illness.  Anyone can become dizzy, but dizziness is more common in older adults.  Drink enough fluid to keep your urine pale yellow. Do not drink alcohol.  Avoid making quick movements if you feel dizzy. Monitor your dizziness for any changes.  This information is not intended to replace advice given to you by your health care provider. Make sure you discuss any questions you have with your health care provider.

## 2023-09-10 NOTE — ED ADULT NURSE NOTE - CHPI ED NUR SYMPTOMS NEG
no change in level of consciousness/no confusion/no dizziness/no fever/no loss of consciousness/no nausea/no vomiting

## 2023-09-10 NOTE — ED PROVIDER NOTE - OBJECTIVE STATEMENT
68 y/o male w/PMHx of HLD on Lisinopril and HTN presents to the ED BIBEMS from home for evaluation. Pt states he had a few alcoholic drinks last night, but woke up today and felt lightheaded when outside. denies LOC or fall. States lightheadedness has subsided PTA at ED. 66 y/o male w/PMHx of HLD on Lisinopril, HTN, and spinal stenosis presents to the ED BIBEMS from home for evaluation. Pt states he had several alcoholic drinks last night with no problems. Pt woke up today at 9 am and began to do chores. He didn't eat or drink anything this morning before doing chores. He was bringing boxes outside when he started to feel lightheaded and lose balance. Pt states they felt better after sitting down. denying CP, HA, dizziness, n/v, blurry vision, abd pain, fever, chills. EKG by EMS showing ectopic beats. EKG at ED not showing any signs of ectopy. denies LOC or fall. States episode of dizziness has subsided PTA at ED.

## 2023-09-10 NOTE — ED PROVIDER NOTE - PROGRESS NOTE DETAILS
Patient reevaluated at bedside states he feels better.  Denies any dizziness or chest pain at present.  Labs and CT with no acute pathology.  Discussed results with the patient.    Lightheadedness likely secondary to hypoglycemia versus alcohol intake overnight. Will discharge with PMD follow-up.  Red flags discussed.  Return precautions given.

## 2023-10-09 ENCOUNTER — APPOINTMENT (OUTPATIENT)
Dept: ORTHOPEDIC SURGERY | Facility: CLINIC | Age: 68
End: 2023-10-09

## 2024-06-21 ENCOUNTER — NON-APPOINTMENT (OUTPATIENT)
Age: 69
End: 2024-06-21

## 2024-12-20 NOTE — ED ADULT NURSE NOTE - NS ED NURSE LEVEL OF CONSCIOUSNESS ORIENTATION
Youtube \"[address]\"     Youtube \"Mini walk Citizen of [place]\"    Oriented - self; Oriented - place; Oriented - time

## 2025-04-15 ENCOUNTER — EMERGENCY (EMERGENCY)
Facility: HOSPITAL | Age: 70
LOS: 0 days | Discharge: HOME | End: 2025-04-15
Attending: EMERGENCY MEDICINE
Payer: MEDICARE

## 2025-04-15 VITALS
TEMPERATURE: 98 F | DIASTOLIC BLOOD PRESSURE: 72 MMHG | OXYGEN SATURATION: 97 % | SYSTOLIC BLOOD PRESSURE: 127 MMHG | RESPIRATION RATE: 17 BRPM | HEART RATE: 75 BPM

## 2025-04-15 VITALS — WEIGHT: 199.96 LBS

## 2025-04-15 DIAGNOSIS — S73.101A UNSPECIFIED SPRAIN OF RIGHT HIP, INITIAL ENCOUNTER: ICD-10-CM
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
## 2025-04-15 DIAGNOSIS — I10 ESSENTIAL (PRIMARY) HYPERTENSION: ICD-10-CM

## 2025-04-15 DIAGNOSIS — E78.00 PURE HYPERCHOLESTEROLEMIA, UNSPECIFIED: ICD-10-CM
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
## 2025-04-15 DIAGNOSIS — M25.551 PAIN IN RIGHT HIP: ICD-10-CM

## 2025-04-15 PROCEDURE — 99284 EMERGENCY DEPT VISIT MOD MDM: CPT | Mod: FS

## 2025-04-15 PROCEDURE — 73502 X-RAY EXAM HIP UNI 2-3 VIEWS: CPT | Mod: 26,RT

## 2025-04-15 PROCEDURE — 99283 EMERGENCY DEPT VISIT LOW MDM: CPT | Mod: 25

## 2025-04-15 PROCEDURE — 73502 X-RAY EXAM HIP UNI 2-3 VIEWS: CPT | Mod: RT

## 2025-04-15 RX ORDER — IBUPROFEN 200 MG
600 TABLET ORAL ONCE
Refills: 0 | Status: COMPLETED | OUTPATIENT
Start: 2025-04-15 | End: 2025-04-15

## 2025-04-15 NOTE — ED ADULT NURSE REASSESSMENT NOTE - NS ED NURSE REASSESS COMMENT FT1
Pt seen, treated and discharged by VANESSA Basilio. Pt did not receive medication. As per VANESSA Basilio, pt did not want medication prior to d/c.

## 2025-04-15 NOTE — ED STATDOCS - ATTENDING APP SHARED VISIT CONTRIBUTION OF CARE
I personally saw the patient with the ROSEY, and completed the key components of the history and physical exam. I then discussed the management plan with the ROSEY.

## 2025-04-15 NOTE — ED ADULT TRIAGE NOTE - PATIENT'S PREFERRED PRONOUN
LM on VM to call back. Needs follow up office visit.     Ping CH RN, BSN, PHN  Denver Flex RN     Him/He

## 2025-04-15 NOTE — ED STATDOCS - OBJECTIVE STATEMENT
70 y/o M with PMHX of HTN, high cholesterol presents to ED c/o RLE hip pain after working yesterday and picking up a hose at 4pm, progressively worsening. No trauma/falls. Endorses difficulty ambulating.

## 2025-04-15 NOTE — ED STATDOCS - MUSCULOSKELETAL, MLM
full ROM hip, no bony tenderness in hip or lower back, motor 5/5 b/l LE, able to stand and take a few steps

## 2025-04-15 NOTE — ED STATDOCS - PATIENT PORTAL LINK FT
You can access the FollowMyHealth Patient Portal offered by [facility] by registering at the following website: [email or web address]. By joining [employer]’s FollowMyHealth portal, you will also be able to view your health information using other applications (apps) compatible with our system.

## 2025-04-15 NOTE — ED STATDOCS - CLINICAL SUMMARY MEDICAL DECISION MAKING FREE TEXT BOX
68 y/o M with RLE hip pain. No falls. Will XR. Likely musculoskeletal pain. Treat with anti inflammatories, muscle relaxers and ortho f/u.

## 2025-04-15 NOTE — ED ADULT TRIAGE NOTE - CHIEF COMPLAINT QUOTE
Patient presents to the ER with complaints of right groin and hip pain after working yesterday. Patient reports difficulty ambulating. Denies injury, numbness/tingling.

## 2025-04-15 NOTE — ED STATDOCS - PROGRESS NOTE DETAILS
XR negative for fx, will dc home with RICE, ortho f/u return precautions given pt agreeable to dc and plan of care  Stefani Nation PA-C Patient seen and evaluated, ED attending note and orders reviewed, will continue with patient follow up and care -Stefani Nation PA-C

## 2025-04-17 ENCOUNTER — APPOINTMENT (OUTPATIENT)
Dept: ORTHOPEDIC SURGERY | Facility: CLINIC | Age: 70
End: 2025-04-17
Payer: MEDICARE

## 2025-04-17 DIAGNOSIS — M17.0 BILATERAL PRIMARY OSTEOARTHRITIS OF KNEE: ICD-10-CM

## 2025-04-17 PROCEDURE — 20610 DRAIN/INJ JOINT/BURSA W/O US: CPT | Mod: RT

## 2025-04-17 PROCEDURE — 99203 OFFICE O/P NEW LOW 30 MIN: CPT | Mod: 25

## 2025-04-17 PROCEDURE — 73560 X-RAY EXAM OF KNEE 1 OR 2: CPT | Mod: 50

## 2025-04-18 PROBLEM — M17.0 PRIMARY OSTEOARTHRITIS OF BOTH KNEES: Status: ACTIVE | Noted: 2025-04-18

## 2025-04-22 VITALS — HEIGHT: 72 IN | BODY MASS INDEX: 29.12 KG/M2 | WEIGHT: 215 LBS

## 2025-04-24 ENCOUNTER — APPOINTMENT (OUTPATIENT)
Dept: ORTHOPEDIC SURGERY | Facility: CLINIC | Age: 70
End: 2025-04-24

## 2025-04-24 VITALS — BODY MASS INDEX: 26.51 KG/M2 | HEIGHT: 73 IN | WEIGHT: 200 LBS

## 2025-04-24 DIAGNOSIS — M17.12 UNILATERAL PRIMARY OSTEOARTHRITIS, LEFT KNEE: ICD-10-CM

## 2025-04-24 PROCEDURE — 20610 DRAIN/INJ JOINT/BURSA W/O US: CPT | Mod: LT

## 2025-04-24 PROCEDURE — 99213 OFFICE O/P EST LOW 20 MIN: CPT | Mod: 25

## 2025-05-01 PROBLEM — Z78.9 NON-SMOKER: Status: ACTIVE | Noted: 2025-05-01

## 2025-05-02 ENCOUNTER — INPATIENT (INPATIENT)
Facility: HOSPITAL | Age: 70
LOS: 3 days | Discharge: HOME | DRG: 378 | End: 2025-05-06
Attending: STUDENT IN AN ORGANIZED HEALTH CARE EDUCATION/TRAINING PROGRAM | Admitting: STUDENT IN AN ORGANIZED HEALTH CARE EDUCATION/TRAINING PROGRAM
Payer: MEDICARE

## 2025-05-02 VITALS — HEIGHT: 72 IN

## 2025-05-02 DIAGNOSIS — K92.2 GASTROINTESTINAL HEMORRHAGE, UNSPECIFIED: ICD-10-CM

## 2025-05-02 LAB
ALBUMIN SERPL ELPH-MCNC: 3 G/DL — LOW (ref 3.3–5)
ALP SERPL-CCNC: 67 U/L — SIGNIFICANT CHANGE UP (ref 40–120)
ALT FLD-CCNC: 21 U/L — SIGNIFICANT CHANGE UP (ref 12–78)
ANION GAP SERPL CALC-SCNC: 4 MMOL/L — LOW (ref 5–17)
APTT BLD: 25.5 SEC — LOW (ref 26.1–36.8)
AST SERPL-CCNC: 22 U/L — SIGNIFICANT CHANGE UP (ref 15–37)
BASOPHILS # BLD AUTO: 0.05 K/UL — SIGNIFICANT CHANGE UP (ref 0–0.2)
BASOPHILS # BLD AUTO: 0.06 K/UL — SIGNIFICANT CHANGE UP (ref 0–0.2)
BASOPHILS NFR BLD AUTO: 0.4 % — SIGNIFICANT CHANGE UP (ref 0–2)
BASOPHILS NFR BLD AUTO: 0.4 % — SIGNIFICANT CHANGE UP (ref 0–2)
BILIRUB SERPL-MCNC: 0.5 MG/DL — SIGNIFICANT CHANGE UP (ref 0.2–1.2)
BLD GP AB SCN SERPL QL: SIGNIFICANT CHANGE UP
BUN SERPL-MCNC: 19 MG/DL — SIGNIFICANT CHANGE UP (ref 7–23)
CALCIUM SERPL-MCNC: 8.5 MG/DL — SIGNIFICANT CHANGE UP (ref 8.5–10.1)
CHLORIDE SERPL-SCNC: 110 MMOL/L — HIGH (ref 96–108)
CO2 SERPL-SCNC: 24 MMOL/L — SIGNIFICANT CHANGE UP (ref 22–31)
CREAT SERPL-MCNC: 1.03 MG/DL — SIGNIFICANT CHANGE UP (ref 0.5–1.3)
EGFR: 78 ML/MIN/1.73M2 — SIGNIFICANT CHANGE UP
EGFR: 78 ML/MIN/1.73M2 — SIGNIFICANT CHANGE UP
EOSINOPHIL # BLD AUTO: 0.22 K/UL — SIGNIFICANT CHANGE UP (ref 0–0.5)
EOSINOPHIL # BLD AUTO: 0.29 K/UL — SIGNIFICANT CHANGE UP (ref 0–0.5)
EOSINOPHIL NFR BLD AUTO: 1.4 % — SIGNIFICANT CHANGE UP (ref 0–6)
EOSINOPHIL NFR BLD AUTO: 2.5 % — SIGNIFICANT CHANGE UP (ref 0–6)
GLUCOSE SERPL-MCNC: 141 MG/DL — HIGH (ref 70–99)
HCT VFR BLD CALC: 37.1 % — LOW (ref 39–50)
HCT VFR BLD CALC: 41.2 % — SIGNIFICANT CHANGE UP (ref 39–50)
HGB BLD-MCNC: 12.4 G/DL — LOW (ref 13–17)
HGB BLD-MCNC: 13.8 G/DL — SIGNIFICANT CHANGE UP (ref 13–17)
IMM GRANULOCYTES # BLD AUTO: 0.09 K/UL — HIGH (ref 0–0.07)
IMM GRANULOCYTES # BLD AUTO: 0.11 K/UL — HIGH (ref 0–0.07)
IMM GRANULOCYTES NFR BLD AUTO: 0.7 % — SIGNIFICANT CHANGE UP (ref 0–0.9)
IMM GRANULOCYTES NFR BLD AUTO: 0.8 % — SIGNIFICANT CHANGE UP (ref 0–0.9)
INR BLD: 0.93 RATIO — SIGNIFICANT CHANGE UP (ref 0.85–1.16)
LACTATE SERPL-SCNC: 1.5 MMOL/L — SIGNIFICANT CHANGE UP (ref 0.7–2)
LIDOCAIN IGE QN: 156 U/L — HIGH (ref 13–75)
LYMPHOCYTES # BLD AUTO: 1.43 K/UL — SIGNIFICANT CHANGE UP (ref 1–3.3)
LYMPHOCYTES # BLD AUTO: 1.61 K/UL — SIGNIFICANT CHANGE UP (ref 1–3.3)
LYMPHOCYTES NFR BLD AUTO: 10.4 % — LOW (ref 13–44)
LYMPHOCYTES NFR BLD AUTO: 12.2 % — LOW (ref 13–44)
MAGNESIUM SERPL-MCNC: 2.1 MG/DL — SIGNIFICANT CHANGE UP (ref 1.6–2.6)
MCHC RBC-ENTMCNC: 32.2 PG — SIGNIFICANT CHANGE UP (ref 27–34)
MCHC RBC-ENTMCNC: 32.6 PG — SIGNIFICANT CHANGE UP (ref 27–34)
MCHC RBC-ENTMCNC: 33.4 G/DL — SIGNIFICANT CHANGE UP (ref 32–36)
MCHC RBC-ENTMCNC: 33.5 G/DL — SIGNIFICANT CHANGE UP (ref 32–36)
MCV RBC AUTO: 96.3 FL — SIGNIFICANT CHANGE UP (ref 80–100)
MCV RBC AUTO: 97.6 FL — SIGNIFICANT CHANGE UP (ref 80–100)
MONOCYTES # BLD AUTO: 1.22 K/UL — HIGH (ref 0–0.9)
MONOCYTES # BLD AUTO: 1.62 K/UL — HIGH (ref 0–0.9)
MONOCYTES NFR BLD AUTO: 10.4 % — SIGNIFICANT CHANGE UP (ref 2–14)
MONOCYTES NFR BLD AUTO: 10.5 % — SIGNIFICANT CHANGE UP (ref 2–14)
NEUTROPHILS # BLD AUTO: 11.79 K/UL — HIGH (ref 1.8–7.4)
NEUTROPHILS # BLD AUTO: 8.62 K/UL — HIGH (ref 1.8–7.4)
NEUTROPHILS NFR BLD AUTO: 73.7 % — SIGNIFICANT CHANGE UP (ref 43–77)
NEUTROPHILS NFR BLD AUTO: 76.6 % — SIGNIFICANT CHANGE UP (ref 43–77)
NRBC # BLD AUTO: 0 K/UL — SIGNIFICANT CHANGE UP (ref 0–0)
NRBC # BLD AUTO: 0 K/UL — SIGNIFICANT CHANGE UP (ref 0–0)
NRBC # FLD: 0 K/UL — SIGNIFICANT CHANGE UP (ref 0–0)
NRBC # FLD: 0 K/UL — SIGNIFICANT CHANGE UP (ref 0–0)
NRBC BLD AUTO-RTO: 0 /100 WBCS — SIGNIFICANT CHANGE UP (ref 0–0)
NRBC BLD AUTO-RTO: 0 /100 WBCS — SIGNIFICANT CHANGE UP (ref 0–0)
PLATELET # BLD AUTO: 284 K/UL — SIGNIFICANT CHANGE UP (ref 150–400)
PLATELET # BLD AUTO: 306 K/UL — SIGNIFICANT CHANGE UP (ref 150–400)
PMV BLD: 10.4 FL — SIGNIFICANT CHANGE UP (ref 7–13)
PMV BLD: 10.7 FL — SIGNIFICANT CHANGE UP (ref 7–13)
POTASSIUM SERPL-MCNC: 4.8 MMOL/L — SIGNIFICANT CHANGE UP (ref 3.5–5.3)
POTASSIUM SERPL-SCNC: 4.8 MMOL/L — SIGNIFICANT CHANGE UP (ref 3.5–5.3)
PROT SERPL-MCNC: 6.2 GM/DL — SIGNIFICANT CHANGE UP (ref 6–8.3)
PROTHROM AB SERPL-ACNC: 11 SEC — SIGNIFICANT CHANGE UP (ref 9.9–13.4)
RBC # BLD: 3.8 M/UL — LOW (ref 4.2–5.8)
RBC # BLD: 4.28 M/UL — SIGNIFICANT CHANGE UP (ref 4.2–5.8)
RBC # FLD: 13 % — SIGNIFICANT CHANGE UP (ref 10.3–14.5)
RBC # FLD: 13.2 % — SIGNIFICANT CHANGE UP (ref 10.3–14.5)
SODIUM SERPL-SCNC: 138 MMOL/L — SIGNIFICANT CHANGE UP (ref 135–145)
WBC # BLD: 11.7 K/UL — HIGH (ref 3.8–10.5)
WBC # BLD: 15.41 K/UL — HIGH (ref 3.8–10.5)
WBC # FLD AUTO: 11.7 K/UL — HIGH (ref 3.8–10.5)
WBC # FLD AUTO: 15.41 K/UL — HIGH (ref 3.8–10.5)

## 2025-05-02 PROCEDURE — 74178 CT ABD&PLV WO CNTR FLWD CNTR: CPT | Mod: MC

## 2025-05-02 PROCEDURE — 86901 BLOOD TYPING SEROLOGIC RH(D): CPT

## 2025-05-02 PROCEDURE — 80053 COMPREHEN METABOLIC PANEL: CPT

## 2025-05-02 PROCEDURE — P9016: CPT

## 2025-05-02 PROCEDURE — 70450 CT HEAD/BRAIN W/O DYE: CPT | Mod: MC

## 2025-05-02 PROCEDURE — 99223 1ST HOSP IP/OBS HIGH 75: CPT | Mod: AI

## 2025-05-02 PROCEDURE — 80048 BASIC METABOLIC PNL TOTAL CA: CPT

## 2025-05-02 PROCEDURE — 86850 RBC ANTIBODY SCREEN: CPT

## 2025-05-02 PROCEDURE — 86900 BLOOD TYPING SEROLOGIC ABO: CPT

## 2025-05-02 PROCEDURE — 36430 TRANSFUSION BLD/BLD COMPNT: CPT

## 2025-05-02 PROCEDURE — 36415 COLL VENOUS BLD VENIPUNCTURE: CPT

## 2025-05-02 PROCEDURE — 83735 ASSAY OF MAGNESIUM: CPT

## 2025-05-02 PROCEDURE — 99285 EMERGENCY DEPT VISIT HI MDM: CPT

## 2025-05-02 PROCEDURE — 74178 CT ABD&PLV WO CNTR FLWD CNTR: CPT | Mod: 26

## 2025-05-02 PROCEDURE — 85027 COMPLETE CBC AUTOMATED: CPT

## 2025-05-02 PROCEDURE — 86923 COMPATIBILITY TEST ELECTRIC: CPT

## 2025-05-02 RX ORDER — FENOFIBRATE 160 MG/1
145 TABLET ORAL DAILY
Refills: 0 | Status: DISCONTINUED | OUTPATIENT
Start: 2025-05-02 | End: 2025-05-06

## 2025-05-02 RX ORDER — ATORVASTATIN CALCIUM 80 MG/1
10 TABLET, FILM COATED ORAL AT BEDTIME
Refills: 0 | Status: DISCONTINUED | OUTPATIENT
Start: 2025-05-02 | End: 2025-05-06

## 2025-05-02 RX ORDER — POLYETHYLENE GLYCOL-3350 AND ELECTROLYTES 236; 6.74; 5.86; 2.97; 22.74 G/274.31G; G/274.31G; G/274.31G; G/274.31G; G/274.31G
2 POWDER, FOR SOLUTION ORAL ONCE
Refills: 0 | Status: COMPLETED | OUTPATIENT
Start: 2025-05-03 | End: 2025-05-03

## 2025-05-02 RX ADMIN — ATORVASTATIN CALCIUM 10 MILLIGRAM(S): 80 TABLET, FILM COATED ORAL at 22:01

## 2025-05-02 NOTE — PATIENT PROFILE ADULT - FALL HARM RISK - FALL HARM RISK
Coagulation Afib    Anxiety    Asthma    Emphysema/COPD    HLD (hyperlipidemia)    HTN (hypertension)

## 2025-05-02 NOTE — H&P ADULT - NSHPPHYSICALEXAM_GEN_ALL_CORE
Vitals  T(F): 98 (05-02-25 @ 11:03), Max: 98 (05-02-25 @ 11:03)  HR: 100 (05-02-25 @ 11:03) (100 - 100)  BP: 122/78 (05-02-25 @ 15:22) (122/78 - 147/101)  RR: 18 (05-02-25 @ 11:03) (18 - 18)  SpO2: 100% (05-02-25 @ 11:03) (100% - 100%)    PHYSICAL EXAM   Gen: NAD, comfortable, AA&Ox4  HEENT: head atrumatic and normocephalic, EOMI  CVS: +s1, s2, regular rate and rhythm, no murmurs, rubs or gallops, no thrill, normal PMI  Pulmonary: normal respiratory effort, clear to auscultation b/l, no wheezes/crackles/rhonchi  Abdomen: soft, non-tender, non-distended, +bowel sounds in all 4 quadrants, no masses noted, no guarding or rigidity   Extremities: no pedal edema, pedal pulses palpable  Skin: nl warm and dry, no wounds   Neuro: answering questions appropriately, face symmetric

## 2025-05-02 NOTE — ED ADULT NURSE NOTE - NSFALLHARMRISKINTERV_ED_ALL_ED

## 2025-05-02 NOTE — CONSULT NOTE ADULT - PROBLEM SELECTOR RECOMMENDATION 9
- Reviewed with Dr. Ochoa. As the pt is stable and pt has hgb reserve, recommend conservative management with resuscitation/ blood. Diverticular bleeds can stop on their own over time. If pt does not respond to resuscitation and decompensates further, please contact IR again.

## 2025-05-02 NOTE — CONSULT NOTE ADULT - SUBJECTIVE AND OBJECTIVE BOX
Chief Complaints:   Patient is a 69y old  Male who presents with a chief complaint of GIB    HPI:  70 y/o M with PMHx of HLD, HTN, diverticulitis s/p colon resection (2002) presents to ED with his wife c/o 9 episodes of bright red rectal bleeding suddenly starting this morning. Endorses dizziness. Denies abdominal pain, fevers. Last colonoscopy 3 years ago with Dr Gonzalez on [facility]. No blood thinner use. CT with possible bleed. IR consulted for evaluation. Pt seen at bedside, reported the above, denied pain fever NV.    Allergies  No Known Allergies    PAST MEDICAL & SURGICAL HISTORY:  Hypertension, unspecified type  High cholesterol      Review of Systems:  As per HPI    Vital Signs Last 24 Hrs  T(C): 36.7 (02 May 2025 11:03), Max: 36.7 (02 May 2025 11:03)  T(F): 98 (02 May 2025 11:03), Max: 98 (02 May 2025 11:03)  HR: 100 (02 May 2025 11:03) (100 - 100)  BP: 122/88 (02 May 2025 12:45) (122/88 - 147/101)  BP(mean): 116 (02 May 2025 11:03) (116 - 116)  RR: 18 (02 May 2025 11:03) (18 - 18)  SpO2: 100% (02 May 2025 11:03) (100% - 100%)    Parameters below as of 02 May 2025 11:03  Patient On (Oxygen Delivery Method): room air      GENERAL APPEARANCE: Well developed, in no acute distress.    LUNGS: Auscultation of the lungs revealed normal breath sounds without any other adventitious sounds or rubs.    CARDIOVASCULAR: There was a regular rate and rhythm    ABDOMEN: Soft and nontender with normal bowel sounds.     NEUROLOGIC: Alert and oriented x 3. Normal affect.       CBC                        12.4   15.41 )-----------( 284      ( 02 May 2025 14:23 )             37.1       Chemistry  05-02    138  |  110[H]  |  19  ----------------------------<  141[H]  4.8   |  24  |  1.03    Ca    8.5      02 May 2025 11:47  Mg     2.1     05-02    TPro  6.2  /  Alb  3.0[L]  /  TBili  0.5  /  DBili  x   /  AST  22  /  ALT  21  /  AlkPhos  67  05-02      Coags  PT/INR - ( 02 May 2025 11:58 )   PT: 11.0 sec;   INR: 0.93 ratio    PTT - ( 02 May 2025 11:58 )  PTT:25.5 sec      < from: CT Abdomen and Pelvis w/wo IV Cont (05.02.25 @ 13:26) >  IMPRESSION:  Focus of acute GI bleeding in the splenic flexure/proximal descending   colon. Source of bleeding not definitively identified, but suspect could   be related to a small diverticulum with stranding in the proximal   descending colon.    < end of copied text >  
Patient is a 69y old  Male who presents with a chief complaint of rectal bleeding      HPI:  This is a 69 year old male with significant history HTN, HLD, spinal stenosis, diverticulosis presenting with acute hematochezia. Patient A&Ox3 at bedside endorsing bleeding first began this morning upon returning home with urge to defecate and had voluminous bright red bloody movement followed by several more episodes last ~2pm here in ED. Denies dizziness, chest pain, or syncope. Does have history diverticulosis, diverticulitis s/p resection sigmoid colon 2003. Hemodynamically stable, on room air. CTA with localized active bleed in splenic flexure/proximal descending colon. Last colonoscopy 2021 with Dr. Smiley with per patient no polyps, unremarkable. Denies blood thinners. Does admit to high dose ibuprofen 800mg daily for past several months for chronic back pain. Denies melena or hematemesis. Denies abdominal discomfort. Denies any history of GIB in past. Hgb 12.4; BUN/Cr 19/1.03. IR consulted for active bleed without recommendation for embolization given clinical stability.    PAST MEDICAL & SURGICAL HISTORY:  Hypertension, unspecified type      High cholesterol    MEDICATIONS  (STANDING):    MEDICATIONS  (PRN):      Allergies    No Known Allergies    Intolerances    SOCIAL HISTORY:    FAMILY HISTORY:      REVIEW OF SYSTEMS:    CONSTITUTIONAL: No weakness, fevers or chills  EYES/ENT: No visual changes;  No vertigo or throat pain   NECK: No pain or stiffness  RESPIRATORY: No cough, wheezing, hemoptysis; No shortness of breath  CARDIOVASCULAR: No chest pain or palpitations  GASTROINTESTINAL: See HPI  GENITOURINARY: No dysuria, frequency or hematuria  NEUROLOGICAL: No numbness or weakness  SKIN: No itching, burning, rashes, or lesions   PSYCH: Normal mood and affect  All other review of systems is negative unless indicated above.    Vital Signs Last 24 Hrs  T(C): 36.7 (02 May 2025 11:03), Max: 36.7 (02 May 2025 11:03)  T(F): 98 (02 May 2025 11:03), Max: 98 (02 May 2025 11:03)  HR: 100 (02 May 2025 11:03) (100 - 100)  BP: 122/78 (02 May 2025 15:22) (122/78 - 147/101)  BP(mean): 116 (02 May 2025 11:03) (116 - 116)  RR: 18 (02 May 2025 11:03) (18 - 18)  SpO2: 100% (02 May 2025 11:03) (100% - 100%)    Parameters below as of 02 May 2025 11:03  Patient On (Oxygen Delivery Method): room air        PHYSICAL EXAM:    Constitutional: No acute distress, well-developed, non-toxic appearing  HEENT: good phonation, not icteric  Neck: supple, no lymphadenopathy  Respiratory: clear to ascultation bilaterally, no wheezing  Cardiovascular: S1 and S2, regular rate and rhythm, no murmurs rubs or gallops  Gastrointestinal: soft, non-tender, non-distended, +bowel sounds, no rebound or guarding, no surgical scars, no drains  Extremities: No peripheral edema, no cyanosis or clubbing  Vascular: 2+ peripheral pulses, no venous stasis  Neurological: A/O x 3, no focal deficits, no asterixis  Psychiatric: Normal mood, normal affect  Skin: No rashes, not jaundiced    LABS:                        12.4   15.41 )-----------( 284      ( 02 May 2025 14:23 )             37.1     05-02    138  |  110[H]  |  19  ----------------------------<  141[H]  4.8   |  24  |  1.03    Ca    8.5      02 May 2025 11:47  Mg     2.1     05-02    TPro  6.2  /  Alb  3.0[L]  /  TBili  0.5  /  DBili  x   /  AST  22  /  ALT  21  /  AlkPhos  67  05-02    PT/INR - ( 02 May 2025 11:58 )   PT: 11.0 sec;   INR: 0.93 ratio         PTT - ( 02 May 2025 11:58 )  PTT:25.5 sec  LIVER FUNCTIONS - ( 02 May 2025 11:47 )  Alb: 3.0 g/dL / Pro: 6.2 gm/dL / ALK PHOS: 67 U/L / ALT: 21 U/L / AST: 22 U/L / GGT: x             RADIOLOGY & ADDITIONAL STUDIES:  FINDINGS:  LOWER CHEST: Coronary artery calcifications.    LIVER: Stable left hepatic lobe cyst.  BILE DUCTS: Normal caliber.  GALLBLADDER: Within normal limits.  SPLEEN: Within normal limits.  PANCREAS: Within normal limits.  ADRENALS: Within normal limits.  KIDNEYS/URETERS: Within normal limits.    BLADDER: Within normal limits.  REPRODUCTIVE ORGANS: Prostate is enlarged.    BOWEL: No bowel obstruction. Focus of arterial intraluminal density at   the splenic flexure and proximal descending colon, which appears to   dissipate on the delayed phase images, consistent with acute GI bleeding.   No definite underlying colonic mass or filling defect seen by CT. Source   of bleeding not definitively identified, but could be a small   diverticulum with subtle fat stranding at the proximal descending colon   (2-35; 4-31). Normal appendix. Rectosigmoid anastomosis. Scattered   diverticulosis.  PERITONEUM/RETROPERITONEUM: Within normal limits.  VESSELS: Atherosclerotic changes.  LYMPH NODES: No lymphadenopathy.  ABDOMINAL WALL: Small fat-containing umbilical hernia. Fat-containing   left inguinal hernia.  BONES: Degenerative changes.    IMPRESSION:  Focus of acute GI bleeding in the splenic flexure/proximal descending   colon. Source of bleeding not definitively identified, but suspect could   be related to a small diverticulum with stranding in the proximal   descending colon.

## 2025-05-02 NOTE — H&P ADULT - ASSESSMENT
69-year-old male past medical history notable for diverticulitis followed by colonic resection in 2002, hypertension, hyperlipidemia, vitamin D deficiency, osteoarthritis of the knee pending eventual knee replacement presents for 1 day history of acute onset hematochezia. Patient admitted to medicine for further workup and treatment of presumptive diverticular bleed.  At this time patient is hemodynamically stable with most recent hemoglobin 12.4, hematocrit 37.1.  Patient to be admitted with remote telemetry for monitoring.    #Lower GI bleed, presumptive diverticular bleed  #Diverticulosis with history diverticulitis s/p sigmoid resection 2002  – Patient with 1 day history of acute onset bright red blood per rectum, has never had GI bleed in the past  – colonoscopy for routine screening performed approximately 3 weeks ago without any acute findings per patient, patient does not have an outpatient gastroenterologist  – On admission, hemoglobin 12.4, hematocrit 37.1 (previous labs September 2023 showing hemoglobin 14.3, hematocrit 41.5)  – Presently patient hemodynamically stable with blood pressure 120s/70s, initially slightly tachycardic now heart rate 80s  – CT abdomen pelvis with IV contrast performed showing acute GI bleeding at the splenic flexure/proximal descending colon, could be small diverticulum with stranding at proximal descending colon  – IR consulted, recommending conservative management with fluid resuscitation and blood if needed for now  – Patient without resting tachycardia or hypotension at this time, will maintain on remote telemetry for now  – Maintain on clear liquid diet for now  – GI consulted by ED, plan for bowel prep to a certain resolution of bleeding and clear colon of old blood products  – Possible colonoscopy inpatient versus outpatient pending further clinical course  – Maintain active type and screen  – Next H/H for midnight 5/2/2025, if precipitous drop or hemodynamic instability, stat CTA for evaluation for IR embolization     #Hypertension  – Hold home lisinopril 30 mg and Norvasc 5 mg for now given diverticular bleed    #Hyperlipidemia  – Continue with home atorvastatin 10 mg  – Continue with home fenofibrate 145 mg once daily    #Vitamin D deficiency  #Vitamin C deficiency  – Resume ascorbic acid 500 mg daily and ergocalciferol 1.25 mg once weekly on discharge    GI PPx: None   DVT PPx: SCDs given diverticular bleed     F: PRN   E: 4/3/2  N: clear liquid diet   A: as tolerated     FULL CODE  HCP: Yolanda Rockwell (wife) [phone] 69-year-old male past medical history notable for diverticulitis followed by colonic resection in 2002, hypertension, hyperlipidemia, vitamin D deficiency, osteoarthritis of the knee pending eventual knee replacement presents for 1 day history of acute onset hematochezia. Patient admitted to medicine for further workup and treatment of presumptive diverticular bleed.  At this time patient is hemodynamically stable with most recent hemoglobin 12.4, hematocrit 37.1.  Patient to be admitted with remote telemetry for monitoring.    #Lower GI bleed, presumptive diverticular bleed  #Diverticulosis with history diverticulitis s/p sigmoid resection 2002  – Patient with 1 day history of acute onset bright red blood per rectum, has never had GI bleed in the past  – colonoscopy for routine screening performed approximately 3 weeks ago without any acute findings per patient, patient does not have an outpatient gastroenterologist  – On admission, hemoglobin 12.4, hematocrit 37.1 (previous labs September 2023 showing hemoglobin 14.3, hematocrit 41.5)  – Presently patient hemodynamically stable with blood pressure 120s/70s, initially slightly tachycardic now heart rate 80s  – CT abdomen pelvis with IV contrast performed showing acute GI bleeding at the splenic flexure/proximal descending colon, could be small diverticulum with stranding at proximal descending colon  – IR consulted, recommending conservative management with fluid resuscitation and blood if needed for now  – Patient without resting tachycardia or hypotension at this time, will maintain on remote telemetry for now  – Maintain on clear liquid diet for now  – GI consulted by ED, plan for bowel prep to a certain resolution of bleeding and clear colon of old blood products  – Possible colonoscopy inpatient versus outpatient pending further clinical course  – Maintain active type and screen  – Next H/H for midnight 5/2/2025, if precipitous drop or hemodynamic instability, stat CTA for evaluation for IR embolization     #Alcohol use disorder  – Patient reports he drinks approximately 6–7 beers a day  – Patient reports he has never had any history of alcohol withdrawal or DTs in the past  – Notably patient has not been sober in years and admits to drinking daily  – States that approximately 4 years prior he was sober from alcohol for 3-4 days without any evidence of withdrawal  – Given patient hemodynamically stable for now and without evidence of withdrawal, will maintain on CIWA for monitoring for now    #Hypertension  – Hold home lisinopril 30 mg and Norvasc 5 mg for now given diverticular bleed    #Hyperlipidemia  – Continue with home atorvastatin 10 mg  – Continue with home fenofibrate 145 mg once daily    #Vitamin D deficiency  #Vitamin C deficiency  – Resume ascorbic acid 500 mg daily and ergocalciferol 1.25 mg once weekly on discharge    GI PPx: None   DVT PPx: SCDs given diverticular bleed     F: PRN   E: 4/3/2  N: clear liquid diet   A: as tolerated     FULL CODE  HCP: Yolanda Rockwell (wife) [phone]

## 2025-05-02 NOTE — ED ADULT NURSE REASSESSMENT NOTE - NS ED NURSE REASSESS COMMENT FT1
Plan of care assumed from SHARA Baltazar. Pt. resting in stretcher, endorsing no complaints. Vital signs obtained and documented. Stretcher in lowest position, call bell within reach.

## 2025-05-02 NOTE — ED ADULT NURSE NOTE - PRO INTERPRETER NEED 2
Patient presents with head injury. Patient walking with her  and  started wobbling and couldn't get up the curb and that caused this patient to loose her balance and she fell down on concrete. Patient fell forward and to the right side and hit her arm first and then her head went down second. Patient with black eye on the right side with laceration above the right eye adjacent to eye brow. Patient states she has severe pain in the head and numbness on the entire right side of her face and the entire mouth. Patient is hard of hearing. Patient is oriented to self, , date. Patient not oriented to president but states she does not keep track of that. Neuro assessment performed. Significant for sensory, numbness and tingling in the face. Otherwise neuro intact.
English

## 2025-05-02 NOTE — CONSULT NOTE ADULT - ASSESSMENT
69 year old male with significant history HTN, HLD, spinal stenosis, diverticulosis  presenting with acute hematochezia with on CTA localized active bleed in splenic flexure/proximal descending colon. History diverticulitis with resection sigmoid colon 2003. Last colonoscopy 2021 with Dr. Smiley with per patient no polyps, unremarkable. Multiple episodes hematochezia beginning this morning with last episode in ED ~2pm today.    Imp:  1. Hematochezia 2/2 likely diverticular etiology, proximal sigmoid colon  2. Diverticulosis with history diverticulitis s/p sigmoid resection    Rec:  ::Trend HH, transfuse prn  ::If acute bright red bleeding or sudden worsening hemodynamics occur, recommend stat CTA and IR for embolization   ::Maintain hemodynamics  ::IVF as needed  ::May have clear liquid diet  ::Will administer bowel prep to ascertain resolution of bleeding and clear colon of old blood products  ::Colonoscopy inpatient versus outpatient pending clinical course
70yo M with hx of diverticulitis p/w BRBPR, IR consulted for evaluation  - Hgb from 13.8 to 12.4 over 3 hours  - VSS  - Has not received any blood yet

## 2025-05-02 NOTE — PATIENT PROFILE ADULT - FALL HARM RISK - HARM RISK INTERVENTIONS

## 2025-05-02 NOTE — CONSULT NOTE ADULT - NS ATTEND BILL GEN_ALL_CORE
Please call patient and have her continue to keep menstrual diary. This mild variation in her menstrual cycle might not be due to COVID or Prozac could be just change in her normal cycle. I am not concerned at this time.   Please hae her update me in 3 months Attending to bill

## 2025-05-02 NOTE — ED PROVIDER NOTE - PHYSICAL EXAMINATION
GEN: Patient awake alert NAD.   HEENT: normocephalic, atraumatic, EOMI, no scleral icterus, moist MM  CARDIAC: RRR, S1, S2, no murmur.   PULM: CTA B/L no wheeze, rhonchi, rales.   ABD: soft NT, ND, no rebound no guarding, no CVA tenderness.   MSK: Moving all extremities, no edema. 5/5 strength and full ROM in all extremities.     NEURO: A&Ox3, gait normal, no focal neurological deficits  SKIN: warm, dry, no rash.

## 2025-05-02 NOTE — H&P ADULT - NSHPOUTPATIENTPROVIDERS_GEN_ALL_CORE
Dr. Smiley (PCP) [place]  Dr. Aguillon (cardiologist)  Dr. Crockett (colorectal surgeon) - last saw more than 10 years ago following colonic resection

## 2025-05-02 NOTE — ED PROVIDER NOTE - CLINICAL SUMMARY MEDICAL DECISION MAKING FREE TEXT BOX
70 y/o M with PMHx of HLD, HTN, diverticulitis s/p colon resection (2002) presents to ED with his wife c/o 9 episodes of bright red rectal bleeding suddenly starting this morning.   Patient well-appearing, hemodynamically stable with nonfocal exam.  Patient has history of diverticulitis status post sigmoidectomy over 20 years ago.  Patient likely with diverticular bleed versus colitis versus other source of lower GI bleeding.  Plan for labs, CT.  Reassess

## 2025-05-02 NOTE — ED PROVIDER NOTE - PROGRESS NOTE DETAILS
Patient CT revealing active extra have at the proximal descending colon likely from a diverticuli as per call from radiology.  First hemoglobin is stable and patient remains hemodynamically stable and well-appearing.  Will consult IR for possible embolization, GI, trend hemoglobin admit patient IR states no intervention at this time as patient is hemodynamically stable, hemoglobin stable.  Will admit to medicine.

## 2025-05-02 NOTE — ED ADULT NURSE NOTE - OBJECTIVE STATEMENT
pt ambulatory to the er c/o rectal bleeding starting this morning. reports it started bright red and is now bright red. denies abdominal pain, fevers, fatigue, weakness. endorsing mild dizziness. pt stated he took motrin 800mg yesterday.  hx: diverticulitis and colon resection. pt does not take any anticoagulation medication. Pt is alert and oriented x4. Pt is on RA. Pt denies CP/SOB. Safety maintained.

## 2025-05-02 NOTE — ED ADULT TRIAGE NOTE - CHIEF COMPLAINT QUOTE
pt ambulatory to the er c/o rectal bleeding starting this morning. reports it started dark red and is now bright red. denies abdominal pain, fevers, fatigue, weakness. endorsing mild dizziness. hx diverticulitis and colon resection.

## 2025-05-02 NOTE — H&P ADULT - HISTORY OF PRESENT ILLNESS
Upon arrival to emergency department, patient afebrile.  Heart rate 100 bpm.  Hypertensive to 147/101 mmHg, satting 100% on room air.    Labs showing WBC 15.41, hemoglobin 12.4 (previously 14.3 in September/2023), platelet count 284.  INR 0.93.  BMP showing sodium 138, potassium 4.8, chloride 110, carbon dioxide 24, BUN 19, creatinine 1.03, lipase 156.    Imaging:  – CT abdomen pelvis with IV contrast showing focus of acute GI bleeding at the splenic flexure/proximal descending colon.  Source of bleeding not definitively identified, suspect could be related to small diverticulum with stranding in proximal descending colon    ED intervention:  – Interventional radiology consulted, imaging reviewed with Dr. Roman–recommending conservative management with resuscitation/blood for now 69-year-old male past medical history notable for diverticulitis followed by colonic resection in 2002, hypertension, hyperlipidemia, vitamin D deficiency, osteoarthritis of the knee pending eventual knee replacement presents for 1 day history of acute onset hematochezia.  Patient reports he was in his usual health until morning of admission where he began to have profuse bright red blood per rectum, he has had approximately 5-6 stools on day of admission.  He denies chest pain, shortness of breath, dizziness, or lightheadedness but reports that he feels fatigued.  He has never had GI bleed in the past and states he had a colonoscopy approximately 3 weeks prior for surveillance purposes coordinated by his PCP–he was told there was no acute findings and was told to return for surveillance screening colonoscopy in 10 years.  He denies any fevers or chills, has no other complaints outside of fatigue at this time.     Patient is not on any anticoagulation or antiplatelet agents, he takes ibuprofen 800 mg approximately 3-4 times a week.  He is a current drinker, has approximately 6–7 beers a day.  He denies any personal history of alcohol withdrawal, was last sober for a few days in 2021.  Since, he has been a daily drinker.    Upon arrival to emergency department, patient afebrile.  Heart rate 100 bpm.  Hypertensive to 147/101 mmHg, satting 100% on room air.    Labs showing WBC 15.41, hemoglobin 12.4 (previously 14.3 in September/2023), platelet count 284.  INR 0.93.  BMP showing sodium 138, potassium 4.8, chloride 110, carbon dioxide 24, BUN 19, creatinine 1.03, lipase 156.    Imaging:  – CT abdomen pelvis with IV contrast showing focus of acute GI bleeding at the splenic flexure/proximal descending colon.  Source of bleeding not definitively identified, suspect could be related to small diverticulum with stranding in proximal descending colon    ED intervention:  – Interventional radiology consulted, imaging reviewed with Dr. Roman–recommending conservative management with resuscitation/blood for now    Patient admitted to medicine for further workup and treatment of presumptive diverticular bleed.  At this time patient is hemodynamically stable with most recent hemoglobin 12.4, hematocrit 37.1.  Patient to be admitted with remote telemetry for monitoring.

## 2025-05-02 NOTE — ED PROVIDER NOTE - OBJECTIVE STATEMENT
68 y/o M with PMHx of HLD, HTN, diverticulitis s/p colon resection (2002) presents to ED with his wife c/o 9 episodes of bright red rectal bleeding suddenly starting this morning. Endorses dizziness. Denies abdominal pain, fevers. Last colonoscopy 3 years ago with Dr Gonzalez on [facility]. No blood thinner use.

## 2025-05-02 NOTE — H&P ADULT - NSHPREVIEWOFSYSTEMS_GEN_ALL_CORE
REVIEW OF SYSTEMS:    CONSTITUTIONAL:  No fevers or chills +fatigue  EYES/ENT:  No visual changes;  No vertigo or throat pain   NECK:  No pain or stiffness  RESPIRATORY:  No cough, wheezing, hemoptysis; No shortness of breath  CARDIOVASCULAR:  No chest pain or palpitations  GASTROINTESTINAL:  No abdominal or epigastric pain. No nausea, vomiting, or hematemesis; +bright red blood per rectum   GENITOURINARY:  No dysuria, frequency or hematuria  MUSCULOSKELETAL:  FROM all extremities, normal strength, No calf tenderness  NEUROLOGICAL:  No numbness or weakness  SKIN:  No itching, rashes

## 2025-05-03 LAB
ANION GAP SERPL CALC-SCNC: 6 MMOL/L — SIGNIFICANT CHANGE UP (ref 5–17)
BUN SERPL-MCNC: 19 MG/DL — SIGNIFICANT CHANGE UP (ref 7–23)
CALCIUM SERPL-MCNC: 8.2 MG/DL — LOW (ref 8.5–10.1)
CHLORIDE SERPL-SCNC: 109 MMOL/L — HIGH (ref 96–108)
CO2 SERPL-SCNC: 26 MMOL/L — SIGNIFICANT CHANGE UP (ref 22–31)
CREAT SERPL-MCNC: 1.16 MG/DL — SIGNIFICANT CHANGE UP (ref 0.5–1.3)
EGFR: 68 ML/MIN/1.73M2 — SIGNIFICANT CHANGE UP
EGFR: 68 ML/MIN/1.73M2 — SIGNIFICANT CHANGE UP
GLUCOSE SERPL-MCNC: 114 MG/DL — HIGH (ref 70–99)
HCT VFR BLD CALC: 34.2 % — LOW (ref 39–50)
HCT VFR BLD CALC: 35 % — LOW (ref 39–50)
HGB BLD-MCNC: 11.6 G/DL — LOW (ref 13–17)
HGB BLD-MCNC: 11.8 G/DL — LOW (ref 13–17)
MCHC RBC-ENTMCNC: 32.3 PG — SIGNIFICANT CHANGE UP (ref 27–34)
MCHC RBC-ENTMCNC: 32.5 PG — SIGNIFICANT CHANGE UP (ref 27–34)
MCHC RBC-ENTMCNC: 33.7 G/DL — SIGNIFICANT CHANGE UP (ref 32–36)
MCHC RBC-ENTMCNC: 33.9 G/DL — SIGNIFICANT CHANGE UP (ref 32–36)
MCV RBC AUTO: 95.3 FL — SIGNIFICANT CHANGE UP (ref 80–100)
MCV RBC AUTO: 96.4 FL — SIGNIFICANT CHANGE UP (ref 80–100)
NRBC # BLD AUTO: 0 K/UL — SIGNIFICANT CHANGE UP (ref 0–0)
NRBC # BLD AUTO: 0 K/UL — SIGNIFICANT CHANGE UP (ref 0–0)
NRBC # FLD: 0 K/UL — SIGNIFICANT CHANGE UP (ref 0–0)
NRBC # FLD: 0 K/UL — SIGNIFICANT CHANGE UP (ref 0–0)
NRBC BLD AUTO-RTO: 0 /100 WBCS — SIGNIFICANT CHANGE UP (ref 0–0)
NRBC BLD AUTO-RTO: 0 /100 WBCS — SIGNIFICANT CHANGE UP (ref 0–0)
PLATELET # BLD AUTO: 255 K/UL — SIGNIFICANT CHANGE UP (ref 150–400)
PLATELET # BLD AUTO: 300 K/UL — SIGNIFICANT CHANGE UP (ref 150–400)
PMV BLD: 10.6 FL — SIGNIFICANT CHANGE UP (ref 7–13)
PMV BLD: 11 FL — SIGNIFICANT CHANGE UP (ref 7–13)
POTASSIUM SERPL-MCNC: 4.3 MMOL/L — SIGNIFICANT CHANGE UP (ref 3.5–5.3)
POTASSIUM SERPL-SCNC: 4.3 MMOL/L — SIGNIFICANT CHANGE UP (ref 3.5–5.3)
RBC # BLD: 3.59 M/UL — LOW (ref 4.2–5.8)
RBC # BLD: 3.63 M/UL — LOW (ref 4.2–5.8)
RBC # FLD: 12.9 % — SIGNIFICANT CHANGE UP (ref 10.3–14.5)
RBC # FLD: 13.2 % — SIGNIFICANT CHANGE UP (ref 10.3–14.5)
SODIUM SERPL-SCNC: 141 MMOL/L — SIGNIFICANT CHANGE UP (ref 135–145)
WBC # BLD: 11.25 K/UL — HIGH (ref 3.8–10.5)
WBC # BLD: 11.87 K/UL — HIGH (ref 3.8–10.5)
WBC # FLD AUTO: 11.25 K/UL — HIGH (ref 3.8–10.5)
WBC # FLD AUTO: 11.87 K/UL — HIGH (ref 3.8–10.5)

## 2025-05-03 PROCEDURE — 99233 SBSQ HOSP IP/OBS HIGH 50: CPT

## 2025-05-03 RX ADMIN — FENOFIBRATE 145 MILLIGRAM(S): 160 TABLET ORAL at 09:56

## 2025-05-03 RX ADMIN — POLYETHYLENE GLYCOL-3350 AND ELECTROLYTES 2 LITER(S): 236; 6.74; 5.86; 2.97; 22.74 POWDER, FOR SOLUTION ORAL at 10:12

## 2025-05-03 RX ADMIN — ATORVASTATIN CALCIUM 10 MILLIGRAM(S): 80 TABLET, FILM COATED ORAL at 21:07

## 2025-05-04 LAB
ANION GAP SERPL CALC-SCNC: 9 MMOL/L — SIGNIFICANT CHANGE UP (ref 5–17)
BUN SERPL-MCNC: 10 MG/DL — SIGNIFICANT CHANGE UP (ref 7–23)
CALCIUM SERPL-MCNC: 8.2 MG/DL — LOW (ref 8.5–10.1)
CHLORIDE SERPL-SCNC: 108 MMOL/L — SIGNIFICANT CHANGE UP (ref 96–108)
CO2 SERPL-SCNC: 22 MMOL/L — SIGNIFICANT CHANGE UP (ref 22–31)
CREAT SERPL-MCNC: 1.04 MG/DL — SIGNIFICANT CHANGE UP (ref 0.5–1.3)
EGFR: 77 ML/MIN/1.73M2 — SIGNIFICANT CHANGE UP
EGFR: 77 ML/MIN/1.73M2 — SIGNIFICANT CHANGE UP
GLUCOSE SERPL-MCNC: 163 MG/DL — HIGH (ref 70–99)
HCT VFR BLD CALC: 29 % — LOW (ref 39–50)
HGB BLD-MCNC: 9.8 G/DL — LOW (ref 13–17)
MCHC RBC-ENTMCNC: 32.5 PG — SIGNIFICANT CHANGE UP (ref 27–34)
MCHC RBC-ENTMCNC: 33.8 G/DL — SIGNIFICANT CHANGE UP (ref 32–36)
MCV RBC AUTO: 96 FL — SIGNIFICANT CHANGE UP (ref 80–100)
NRBC # BLD AUTO: 0 K/UL — SIGNIFICANT CHANGE UP (ref 0–0)
NRBC # FLD: 0 K/UL — SIGNIFICANT CHANGE UP (ref 0–0)
NRBC BLD AUTO-RTO: 0 /100 WBCS — SIGNIFICANT CHANGE UP (ref 0–0)
PLATELET # BLD AUTO: 243 K/UL — SIGNIFICANT CHANGE UP (ref 150–400)
PMV BLD: 10.6 FL — SIGNIFICANT CHANGE UP (ref 7–13)
POTASSIUM SERPL-MCNC: 3.3 MMOL/L — LOW (ref 3.5–5.3)
POTASSIUM SERPL-SCNC: 3.3 MMOL/L — LOW (ref 3.5–5.3)
RBC # BLD: 3.02 M/UL — LOW (ref 4.2–5.8)
RBC # FLD: 13 % — SIGNIFICANT CHANGE UP (ref 10.3–14.5)
SODIUM SERPL-SCNC: 139 MMOL/L — SIGNIFICANT CHANGE UP (ref 135–145)
WBC # BLD: 8.33 K/UL — SIGNIFICANT CHANGE UP (ref 3.8–10.5)
WBC # FLD AUTO: 8.33 K/UL — SIGNIFICANT CHANGE UP (ref 3.8–10.5)

## 2025-05-04 PROCEDURE — 99232 SBSQ HOSP IP/OBS MODERATE 35: CPT

## 2025-05-04 RX ORDER — POLYETHYLENE GLYCOL-3350 AND ELECTROLYTES 236; 6.74; 5.86; 2.97; 22.74 G/274.31G; G/274.31G; G/274.31G; G/274.31G; G/274.31G
2 POWDER, FOR SOLUTION ORAL ONCE
Refills: 0 | Status: COMPLETED | OUTPATIENT
Start: 2025-05-04 | End: 2025-05-04

## 2025-05-04 RX ADMIN — ATORVASTATIN CALCIUM 10 MILLIGRAM(S): 80 TABLET, FILM COATED ORAL at 21:19

## 2025-05-04 RX ADMIN — Medication 40 MILLIEQUIVALENT(S): at 13:17

## 2025-05-04 RX ADMIN — FENOFIBRATE 145 MILLIGRAM(S): 160 TABLET ORAL at 10:16

## 2025-05-04 RX ADMIN — POLYETHYLENE GLYCOL-3350 AND ELECTROLYTES 2 LITER(S): 236; 6.74; 5.86; 2.97; 22.74 POWDER, FOR SOLUTION ORAL at 13:18

## 2025-05-05 LAB
ALBUMIN SERPL ELPH-MCNC: 2.5 G/DL — LOW (ref 3.3–5)
ALP SERPL-CCNC: 52 U/L — SIGNIFICANT CHANGE UP (ref 40–120)
ALT FLD-CCNC: 17 U/L — SIGNIFICANT CHANGE UP (ref 12–78)
ANION GAP SERPL CALC-SCNC: 6 MMOL/L — SIGNIFICANT CHANGE UP (ref 5–17)
ANION GAP SERPL CALC-SCNC: 6 MMOL/L — SIGNIFICANT CHANGE UP (ref 5–17)
AST SERPL-CCNC: 18 U/L — SIGNIFICANT CHANGE UP (ref 15–37)
BILIRUB SERPL-MCNC: 0.4 MG/DL — SIGNIFICANT CHANGE UP (ref 0.2–1.2)
BLD GP AB SCN SERPL QL: SIGNIFICANT CHANGE UP
BUN SERPL-MCNC: 7 MG/DL — SIGNIFICANT CHANGE UP (ref 7–23)
BUN SERPL-MCNC: 8 MG/DL — SIGNIFICANT CHANGE UP (ref 7–23)
CALCIUM SERPL-MCNC: 7.8 MG/DL — LOW (ref 8.5–10.1)
CALCIUM SERPL-MCNC: 7.9 MG/DL — LOW (ref 8.5–10.1)
CHLORIDE SERPL-SCNC: 114 MMOL/L — HIGH (ref 96–108)
CHLORIDE SERPL-SCNC: 115 MMOL/L — HIGH (ref 96–108)
CO2 SERPL-SCNC: 23 MMOL/L — SIGNIFICANT CHANGE UP (ref 22–31)
CO2 SERPL-SCNC: 23 MMOL/L — SIGNIFICANT CHANGE UP (ref 22–31)
CREAT SERPL-MCNC: 0.84 MG/DL — SIGNIFICANT CHANGE UP (ref 0.5–1.3)
CREAT SERPL-MCNC: 0.99 MG/DL — SIGNIFICANT CHANGE UP (ref 0.5–1.3)
EGFR: 82 ML/MIN/1.73M2 — SIGNIFICANT CHANGE UP
EGFR: 82 ML/MIN/1.73M2 — SIGNIFICANT CHANGE UP
EGFR: 94 ML/MIN/1.73M2 — SIGNIFICANT CHANGE UP
EGFR: 94 ML/MIN/1.73M2 — SIGNIFICANT CHANGE UP
GLUCOSE SERPL-MCNC: 113 MG/DL — HIGH (ref 70–99)
GLUCOSE SERPL-MCNC: 149 MG/DL — HIGH (ref 70–99)
HCT VFR BLD CALC: 21.7 % — LOW (ref 39–50)
HGB BLD-MCNC: 7.4 G/DL — LOW (ref 13–17)
MAGNESIUM SERPL-MCNC: 1.7 MG/DL — SIGNIFICANT CHANGE UP (ref 1.6–2.6)
MCHC RBC-ENTMCNC: 32.9 PG — SIGNIFICANT CHANGE UP (ref 27–34)
MCHC RBC-ENTMCNC: 34.1 G/DL — SIGNIFICANT CHANGE UP (ref 32–36)
MCV RBC AUTO: 96.4 FL — SIGNIFICANT CHANGE UP (ref 80–100)
NRBC # BLD AUTO: 0 K/UL — SIGNIFICANT CHANGE UP (ref 0–0)
NRBC # FLD: 0 K/UL — SIGNIFICANT CHANGE UP (ref 0–0)
NRBC BLD AUTO-RTO: 0 /100 WBCS — SIGNIFICANT CHANGE UP (ref 0–0)
PLATELET # BLD AUTO: 220 K/UL — SIGNIFICANT CHANGE UP (ref 150–400)
PMV BLD: 10.5 FL — SIGNIFICANT CHANGE UP (ref 7–13)
POTASSIUM SERPL-MCNC: 3.3 MMOL/L — LOW (ref 3.5–5.3)
POTASSIUM SERPL-MCNC: 3.5 MMOL/L — SIGNIFICANT CHANGE UP (ref 3.5–5.3)
POTASSIUM SERPL-SCNC: 3.3 MMOL/L — LOW (ref 3.5–5.3)
POTASSIUM SERPL-SCNC: 3.5 MMOL/L — SIGNIFICANT CHANGE UP (ref 3.5–5.3)
PROT SERPL-MCNC: 4.8 GM/DL — LOW (ref 6–8.3)
RBC # BLD: 2.25 M/UL — LOW (ref 4.2–5.8)
RBC # FLD: 12.7 % — SIGNIFICANT CHANGE UP (ref 10.3–14.5)
SODIUM SERPL-SCNC: 143 MMOL/L — SIGNIFICANT CHANGE UP (ref 135–145)
SODIUM SERPL-SCNC: 144 MMOL/L — SIGNIFICANT CHANGE UP (ref 135–145)
WBC # BLD: 11.03 K/UL — HIGH (ref 3.8–10.5)
WBC # FLD AUTO: 11.03 K/UL — HIGH (ref 3.8–10.5)

## 2025-05-05 PROCEDURE — 74178 CT ABD&PLV WO CNTR FLWD CNTR: CPT | Mod: 26

## 2025-05-05 PROCEDURE — 70450 CT HEAD/BRAIN W/O DYE: CPT | Mod: 26

## 2025-05-05 PROCEDURE — 99233 SBSQ HOSP IP/OBS HIGH 50: CPT

## 2025-05-05 PROCEDURE — 45378 DIAGNOSTIC COLONOSCOPY: CPT

## 2025-05-05 RX ORDER — ONDANSETRON HCL/PF 4 MG/2 ML
4 VIAL (ML) INJECTION ONCE
Refills: 0 | Status: COMPLETED | OUTPATIENT
Start: 2025-05-05 | End: 2025-05-05

## 2025-05-05 RX ADMIN — Medication 4 MILLIGRAM(S): at 03:18

## 2025-05-05 RX ADMIN — ATORVASTATIN CALCIUM 10 MILLIGRAM(S): 80 TABLET, FILM COATED ORAL at 21:13

## 2025-05-05 NOTE — PROGRESS NOTE ADULT - TIME BILLING
Time spent  coordinating the patient's care. This includes reviewing documentation pertinent to this admission, results and imaging. Further tests, medications, and procedures have been ordered as indicated. Laboratory results and the plan of care were communicated to the patient. Discussed care plan with consultants including . Supporting documentation was completed and added to the patient's chart.

## 2025-05-05 NOTE — PROGRESS NOTE ADULT - ASSESSMENT
68yo male with likely diverticular bleeding    imaging initially positive for left-sided bleeding  pt now clinically stable  plan for colonoscopy tomorrow
69-year-old male past medical history notable for diverticulitis followed by colonic resection in 2002, hypertension, hyperlipidemia, vitamin D deficiency, osteoarthritis of the knee pending eventual knee replacement presents for 1 day history of acute onset hematochezia. Patient admitted to medicine for further workup and treatment of presumptive diverticular bleed.  At this time patient is hemodynamically stable with most recent hemoglobin 12.4, hematocrit 37.1.  Patient to be admitted with remote telemetry for monitoring.    #Lower GI bleed, presumptive diverticular bleed  #Diverticulosis with history diverticulitis s/p sigmoid resection 2002  – Patient with 1 day history of acute onset bright red blood per rectum, has never had GI bleed in the past  – colonoscopy for routine screening performed approximately 3 weeks ago without any acute findings per patient, patient does not have an outpatient gastroenterologist  – On admission, hemoglobin 12.4, hematocrit 37.1 (previous labs September 2023 showing hemoglobin 14.3, hematocrit 41.5)  – Presently patient hemodynamically stable with blood pressure 120s/70s, initially slightly tachycardic now heart rate 80s  – CT abdomen pelvis with IV contrast performed showing acute GI bleeding at the splenic flexure/proximal descending colon, could be small diverticulum with stranding at proximal descending colon  – IR consulted, recommending conservative management with fluid resuscitation and blood if needed for now  – Patient without resting tachycardia or hypotension at this time, will maintain on remote telemetry for now  – Maintain on clear liquid diet for now  – Maintain active type and screen  – Repeat CTA performed showing no active extravasation 5/5 AM  – Patient evaluated by GI, plan is for colonoscopy as an inpatient 5/5    #Alcohol use disorder  – Patient reports he drinks approximately 6–7 beers a day  – Patient reports he has never had any history of alcohol withdrawal or DTs in the past  – Notably patient has not been sober in years and admits to drinking daily  – States that approximately 4 years prior he was sober from alcohol for 3-4 days without any evidence of withdrawal  – Given patient hemodynamically stable for now and without evidence of withdrawal, will maintain on CIWA for monitoring for now    #Hypertension  – Hold home lisinopril 30 mg and Norvasc 5 mg for now given diverticular bleed    #Hyperlipidemia  – Continue with home atorvastatin 10 mg  – Continue with home fenofibrate 145 mg once daily    #Vitamin D deficiency  #Vitamin C deficiency  – Resume ascorbic acid 500 mg daily and ergocalciferol 1.25 mg once weekly on discharge    GI PPx: None   DVT PPx: SCDs given diverticular bleed     F: PRN   E: 4/3/2  N: NPO until after colonoscopy  A: as tolerated     FULL CODE  HCP: Yolanda Rockwell (wife) [phone]
69-year-old male past medical history notable for diverticulitis followed by colonic resection in 2002, hypertension, hyperlipidemia, vitamin D deficiency, osteoarthritis of the knee pending eventual knee replacement presents for 1 day history of acute onset hematochezia. Patient admitted to medicine for further workup and treatment of presumptive diverticular bleed.  At this time patient is hemodynamically stable with most recent hemoglobin 12.4, hematocrit 37.1.  Patient to be admitted with remote telemetry for monitoring.    #Lower GI bleed, presumptive diverticular bleed  #Diverticulosis with history diverticulitis s/p sigmoid resection 2002  – Patient with 1 day history of acute onset bright red blood per rectum, has never had GI bleed in the past  – colonoscopy for routine screening performed approximately 3 weeks ago without any acute findings per patient, patient does not have an outpatient gastroenterologist  – On admission, hemoglobin 12.4, hematocrit 37.1 (previous labs September 2023 showing hemoglobin 14.3, hematocrit 41.5)  – Presently patient hemodynamically stable with blood pressure 120s/70s, initially slightly tachycardic now heart rate 80s  – CT abdomen pelvis with IV contrast performed showing acute GI bleeding at the splenic flexure/proximal descending colon, could be small diverticulum with stranding at proximal descending colon  – IR consulted, recommending conservative management with fluid resuscitation and blood if needed for now  – Patient without resting tachycardia or hypotension at this time, will maintain on remote telemetry for now  – Maintain on clear liquid diet for now  – Maintain active type and screen  – Patient evaluated by GI, plan is for colonoscopy as an inpatient 5/5    #Alcohol use disorder  – Patient reports he drinks approximately 6–7 beers a day  – Patient reports he has never had any history of alcohol withdrawal or DTs in the past  – Notably patient has not been sober in years and admits to drinking daily  – States that approximately 4 years prior he was sober from alcohol for 3-4 days without any evidence of withdrawal  – Given patient hemodynamically stable for now and without evidence of withdrawal, will maintain on CIWA for monitoring for now    #Hypertension  – Hold home lisinopril 30 mg and Norvasc 5 mg for now given diverticular bleed    #Hyperlipidemia  – Continue with home atorvastatin 10 mg  – Continue with home fenofibrate 145 mg once daily    #Vitamin D deficiency  #Vitamin C deficiency  – Resume ascorbic acid 500 mg daily and ergocalciferol 1.25 mg once weekly on discharge    GI PPx: None   DVT PPx: SCDs given diverticular bleed     F: PRN   E: 4/3/2  N: clear liquid diet   A: as tolerated     FULL CODE  HCP: Yolanda Rockwell (wife) [phone]
69 year old male with significant history HTN, HLD, spinal stenosis, diverticulosis  presenting with acute hematochezia with on CTA localized active bleed in splenic flexure/proximal descending colon. History diverticulitis with resection sigmoid colon 2003. Last colonoscopy 2021 with Dr. Smiley with per patient no polyps, unremarkable. Multiple episodes hematochezia, since has stopped after bowel prep.    Imp:  1. Hematochezia 2/2 likely diverticular etiology, proximal sigmoid colon  2. Diverticulosis with history diverticulitis s/p sigmoid resection    Rec:  ::Trend HH, transfuse prn  ::If acute bright red bleeding or sudden worsening hemodynamics occur, recommend stat CTA and IR for embolization   ::Maintain hemodynamics  ::IVF as needed  ::May have clear liquid diet  ::Colonoscopy inpatient versus outpatient pending clinical course; d/w wife and pt    Dr. Del Real to see pt tomorrow
69-year-old male past medical history notable for diverticulitis followed by colonic resection in 2002, hypertension, hyperlipidemia, vitamin D deficiency, osteoarthritis of the knee pending eventual knee replacement presents for 1 day history of acute onset hematochezia. Patient admitted to medicine for further workup and treatment of presumptive diverticular bleed.  At this time patient is hemodynamically stable with most recent hemoglobin 12.4, hematocrit 37.1.  Patient to be admitted with remote telemetry for monitoring.    #Lower GI bleed, presumptive diverticular bleed  #Diverticulosis with history diverticulitis s/p sigmoid resection 2002  – Patient with 1 day history of acute onset bright red blood per rectum, has never had GI bleed in the past  – colonoscopy for routine screening performed approximately 3 weeks ago without any acute findings per patient, patient does not have an outpatient gastroenterologist  – On admission, hemoglobin 12.4, hematocrit 37.1 (previous labs September 2023 showing hemoglobin 14.3, hematocrit 41.5)  – Presently patient hemodynamically stable with blood pressure 120s/70s, initially slightly tachycardic now heart rate 80s  – CT abdomen pelvis with IV contrast performed showing acute GI bleeding at the splenic flexure/proximal descending colon, could be small diverticulum with stranding at proximal descending colon  – IR consulted, recommending conservative management with fluid resuscitation and blood if needed for now  – Patient without resting tachycardia or hypotension at this time, will maintain on remote telemetry for now  – Maintain on clear liquid diet for now  – GI consulted by ED, plan for bowel prep to a certain resolution of bleeding and clear colon of old blood products  – Possible colonoscopy inpatient versus outpatient pending further clinical course  – Maintain active type and screen    #Alcohol use disorder  – Patient reports he drinks approximately 6–7 beers a day  – Patient reports he has never had any history of alcohol withdrawal or DTs in the past  – Notably patient has not been sober in years and admits to drinking daily  – States that approximately 4 years prior he was sober from alcohol for 3-4 days without any evidence of withdrawal  – Given patient hemodynamically stable for now and without evidence of withdrawal, will maintain on CIWA for monitoring for now    #Hypertension  – Hold home lisinopril 30 mg and Norvasc 5 mg for now given diverticular bleed    #Hyperlipidemia  – Continue with home atorvastatin 10 mg  – Continue with home fenofibrate 145 mg once daily    #Vitamin D deficiency  #Vitamin C deficiency  – Resume ascorbic acid 500 mg daily and ergocalciferol 1.25 mg once weekly on discharge    GI PPx: None   DVT PPx: SCDs given diverticular bleed     F: PRN   E: 4/3/2  N: clear liquid diet   A: as tolerated     FULL CODE  HCP: Yolanda Rockwell (wife) [phone]

## 2025-05-05 NOTE — CHART NOTE - NSCHARTNOTEFT_GEN_A_CORE
code fall was called    patient was seen and examined, was in the bathroom laying on the floor on his right side  fully awake and alert, not in distress  vitals stable 156/80 , no tachypnea or tachycardia.   Head ATNC, no injuries noted  Neck: no JVD, no injuries noted  CVS: RRR s1s2 no rgm  Resp: clear bilaterlaly, no wheezing, no rhonchi, no distres  GI: soft abdomen non tender non distended. a clot of blood noted next to patient as he was laying on the floor. no active oozing or gushing of blood. there was no stool, it was a blood clot and some streaks of bloody material in the toilet sides but toilet water was clear and not red.    Ext: no edema  MSK: no injuries noted head, neck chest, abdomen or pelvis or extremities, he moves all extremities    patient denied any head injury or LOC. he said he felt dizzy and lightheaded as he was standing up from the toilet seat    patient was then repositioned in bed. repeat BP 160s systolic, patient fully awake and alert. reported nausea while being reportioned back to bed    labs reviewed  head ct ordered and neg for acute findings  ordered neuro checks q 4 hr for now  stat type and cross , cbc, cmp, mag level ordered   ns at 50 cc per hour  consider transfusion pending labs. hg as of 5/4 is 9.8   discussed with nursing and house supervisor

## 2025-05-05 NOTE — PROVIDER CONTACT NOTE (FALL NOTIFICATION) - RECOMMENDATIONS
Stat Labs, CT scan to rule out injury, NS at 50 ml/hr, patient educated on call bell utilization, bed alarm on.

## 2025-05-05 NOTE — PROVIDER CONTACT NOTE (FALL NOTIFICATION) - ASSESSMENT
Patient found laying on his right side, complaining of feeling dizzy and lightheaded. Vital signs stable besides slight tachycardia. Jyoti brown used to transfer patient back to bed.

## 2025-05-05 NOTE — PROVIDER CONTACT NOTE (FALL NOTIFICATION) - SITUATION
Patient is here for bloody stools, receiving prep for colonoscopy today, patient refusing bed alarms and hospital socks, patient found on floor of bathroom with a large episode of bloody stool.

## 2025-05-05 NOTE — PROGRESS NOTE ADULT - SUBJECTIVE AND OBJECTIVE BOX
GI  coverage for Dr. Dawson    Patient is a 69y old  Male who presents with a chief complaint of rectal bleeding    HPI:  no further bleeding  hgb stable  no abd pain  wife at bedside    REVIEW OF SYSTEMS:    CONSTITUTIONAL: No weakness, fevers or chills  EYES/ENT: No visual changes;  No vertigo or throat pain   NECK: No pain or stiffness  RESPIRATORY: No cough, wheezing, hemoptysis; No shortness of breath  CARDIOVASCULAR: No chest pain or palpitations  GASTROINTESTINAL: See HPI  GENITOURINARY: No dysuria, frequency or hematuria  NEUROLOGICAL: No numbness or weakness  SKIN: No itching, burning, rashes, or lesions   PSYCH: Normal mood and affect  All other review of systems is negative unless indicated above.    Vital Signs Last 24 Hrs  T(C): 36.3 (03 May 2025 15:28), Max: 36.8 (02 May 2025 19:31)  T(F): 97.4 (03 May 2025 15:28), Max: 98.3 (02 May 2025 21:48)  HR: 78 (03 May 2025 15:28) (76 - 90)  BP: 130/80 (03 May 2025 15:28) (102/78 - 152/84)  BP(mean): 93 (03 May 2025 15:28) (80 - 99)  RR: 18 (03 May 2025 15:28) (17 - 18)  SpO2: 100% (03 May 2025 15:28) (100% - 100%)    Parameters below as of 03 May 2025 15:28  Patient On (Oxygen Delivery Method): room air      PHYSICAL EXAM:    Constitutional: No acute distress, well-developed, non-toxic appearing  HEENT: good phonation, not icteric  Neck: supple, no lymphadenopathy  Respiratory: clear to ascultation bilaterally, no wheezing  Cardiovascular: S1 and S2, regular rate and rhythm, no murmurs rubs or gallops  Gastrointestinal: soft, non-tender, non-distended, +bowel sounds, no rebound or guarding, no surgical scars, no drains  Extremities: No peripheral edema, no cyanosis or clubbing  Vascular: 2+ peripheral pulses, no venous stasis  Neurological: A/O x 3, no focal deficits, no asterixis  Psychiatric: Normal mood, normal affect  Skin: No rashes, not jaundiced    LABS:                                  11.8   11.25 )-----------( 300      ( 03 May 2025 07:57 )             35.0   
Patient is a 69y old  Male who presents with a chief complaint of hematochezia (04 May 2025 14:49)      HPI:  pt feeling ok  bleeding has decreased      PAST MEDICAL & SURGICAL HISTORY:  Hypertension, unspecified type      High cholesterol          MEDICATIONS  (STANDING):  atorvastatin 10 milliGRAM(s) Oral at bedtime  fenofibrate Tablet 145 milliGRAM(s) Oral daily    MEDICATIONS  (PRN):      Allergies    No Known Allergies    Intolerances        REVIEW OF SYSTEMS:    CONSTITUTIONAL: No weakness, fevers or chills  RESPIRATORY: No cough, wheezing, hemoptysis; No shortness of breath  CARDIOVASCULAR: No chest pain or palpitations  GASTROINTESTINAL: as above  All other review of systems is negative unless indicated above.    Vital Signs Last 24 Hrs  T(C): 36.6 (04 May 2025 00:09), Max: 36.6 (04 May 2025 00:09)  T(F): 97.8 (04 May 2025 00:09), Max: 97.8 (04 May 2025 00:09)  HR: 73 (04 May 2025 00:09) (73 - 73)  BP: 114/54 (04 May 2025 00:09) (114/54 - 114/54)  BP(mean): --  RR: 18 (04 May 2025 00:09) (18 - 18)  SpO2: 97% (04 May 2025 00:09) (97% - 97%)    Parameters below as of 04 May 2025 00:09  Patient On (Oxygen Delivery Method): room air        PHYSICAL EXAM:    Constitutional: NAD  Gastrointestinal: BS+, soft, NT/ND    LABS:                        9.8    8.33  )-----------( 243      ( 04 May 2025 08:53 )             29.0     05-04    139  |  108  |  10  ----------------------------<  163[H]  3.3[L]   |  22  |  1.04    Ca    8.2[L]      04 May 2025 08:53            RADIOLOGY & ADDITIONAL STUDIES:
HOSPITALIST ATTENDING PROGRESS NOTE    Chart and meds reviewed.  Patient seen and examined.    Subjective:  Patient seen this morning and later in afternoon with wife at bedside.  He reports that he does not feel dizzy or lightheaded, denies any abdominal pain.  He did not have any bowel movements overnight and was started on bowel prep this morning to ascertain active bleeding, reports that his stools have been less bloody but still with bright red blood.    Wife at bedside clarifies that his last colonoscopy was not 3 weeks, rather he had it done approximately 3-4 years prior.    All other systems reviewed and found to be negative with the exception of what has been described above.    MEDICATIONS  (STANDING):  atorvastatin 10 milliGRAM(s) Oral at bedtime  fenofibrate Tablet 145 milliGRAM(s) Oral daily    MEDICATIONS  (PRN):      VITALS:  T(F): 97.4 (05-03-25 @ 08:04), Max: 98.3 (05-02-25 @ 21:48)  HR: 82 (05-03-25 @ 08:04) (76 - 90)  BP: 149/76 (05-03-25 @ 08:04) (102/78 - 152/84)  RR: 17 (05-02-25 @ 22:32) (17 - 18)  SpO2: 100% (05-03-25 @ 08:04) (100% - 100%)  Wt(kg): --    I&O's Summary      CAPILLARY BLOOD GLUCOSE          PHYSICAL EXAM:  Gen: NAD, comfortable, AA&Ox4  HEENT: head atrumatic and normocephalic, EOMI  CVS: +s1, s2, regular rate and rhythm, no murmurs, rubs or gallops, no thrill, normal PMI  Pulmonary: normal respiratory effort, clear to auscultation b/l, no wheezes/crackles/rhonchi  Abdomen: soft, non-tender, non-distended, +bowel sounds in all 4 quadrants, no masses noted, no guarding or rigidity   Extremities: no pedal edema, pedal pulses palpable  Skin: nl warm and dry, no wounds   Neuro: answering questions appropriately, face symmetric    LABS:                            11.8   11.25 )-----------( 300      ( 03 May 2025 07:57 )             35.0     05-03    141  |  109[H]  |  19  ----------------------------<  114[H]  4.3   |  26  |  1.16    Ca    8.2[L]      03 May 2025 07:57  Mg     2.1     05-02    TPro  6.2  /  Alb  3.0[L]  /  TBili  0.5  /  DBili  x   /  AST  22  /  ALT  21  /  AlkPhos  67  05-02        LIVER FUNCTIONS - ( 02 May 2025 11:47 )  Alb: 3.0 g/dL / Pro: 6.2 gm/dL / ALK PHOS: 67 U/L / ALT: 21 U/L / AST: 22 U/L / GGT: x           PT/INR - ( 02 May 2025 11:58 )   PT: 11.0 sec;   INR: 0.93 ratio         PTT - ( 02 May 2025 11:58 )  PTT:25.5 sec  Urinalysis Basic - ( 03 May 2025 07:57 )    Color: x / Appearance: x / SG: x / pH: x  Gluc: 114 mg/dL / Ketone: x  / Bili: x / Urobili: x   Blood: x / Protein: x / Nitrite: x   Leuk Esterase: x / RBC: x / WBC x   Sq Epi: x / Non Sq Epi: x / Bacteria: x              CULTURES:      Additional results/Imaging, I have personally reviewed:    Telemetry, personally reviewed:
HOSPITALIST ATTENDING PROGRESS NOTE    Chart and meds reviewed.  Patient seen and examined.    Subjective:  Patient seen today, reports that he feels no shortness of breath or dyspnea at this time.  Overnight patient had a syncopal episode where he fell.  He was noted to have some brisk bloody stool just prior by overnight team.  He was transfused 2 units and had CT head Noncon ordered showing no acute intracranial pathology.  This morning he had CT abdomen pelvis with IV contrast showing no active extravasation on imaging.    All other systems reviewed and found to be negative with the exception of what has been described above.    MEDICATIONS  (STANDING):  atorvastatin 10 milliGRAM(s) Oral at bedtime  fenofibrate Tablet 145 milliGRAM(s) Oral daily  sodium chloride 0.9%. 1000 milliLiter(s) (50 mL/Hr) IV Continuous <Continuous>    MEDICATIONS  (PRN):      VITALS:  T(F): 97.6 (05-05-25 @ 11:30), Max: 98.6 (05-05-25 @ 09:08)  HR: 74 (05-05-25 @ 11:30) (64 - 98)  BP: 154/90 (05-05-25 @ 11:30) (139/56 - 168/86)  RR: 18 (05-05-25 @ 11:30) (18 - 20)  SpO2: 100% (05-05-25 @ 11:30) (94% - 100%)  Wt(kg): --    I&O's Summary    05 May 2025 07:01  -  05 May 2025 14:55  --------------------------------------------------------  IN: 602 mL / OUT: 0 mL / NET: 602 mL        CAPILLARY BLOOD GLUCOSE          PHYSICAL EXAM:  Gen: NAD, comfortable, AA&Ox4  HEENT: head atrumatic and normocephalic, EOMI  CVS: +s1, s2, regular rate and rhythm, no murmurs, rubs or gallops, no thrill, normal PMI  Pulmonary: normal respiratory effort, clear to auscultation b/l, no wheezes/crackles/rhonchi  Abdomen: soft, non-tender, non-distended, +bowel sounds in all 4 quadrants, no masses noted, no guarding or rigidity   Extremities: no pedal edema, pedal pulses palpable  Skin: nl warm and dry, no wounds   Neuro: answering questions appropriately, face symmetric    LABS:                            7.4    11.03 )-----------( 220      ( 05 May 2025 05:16 )             21.7     05-05    144  |  115[H]  |  8   ----------------------------<  113[H]  3.5   |  23  |  0.84    Ca    7.8[L]      05 May 2025 05:16  Mg     1.7     05-05    TPro  4.8[L]  /  Alb  2.5[L]  /  TBili  0.4  /  DBili  x   /  AST  18  /  ALT  17  /  AlkPhos  52  05-05        LIVER FUNCTIONS - ( 05 May 2025 02:53 )  Alb: 2.5 g/dL / Pro: 4.8 gm/dL / ALK PHOS: 52 U/L / ALT: 17 U/L / AST: 18 U/L / GGT: x             Urinalysis Basic - ( 05 May 2025 05:16 )    Color: x / Appearance: x / SG: x / pH: x  Gluc: 113 mg/dL / Ketone: x  / Bili: x / Urobili: x   Blood: x / Protein: x / Nitrite: x   Leuk Esterase: x / RBC: x / WBC x   Sq Epi: x / Non Sq Epi: x / Bacteria: x              CULTURES:      Additional results/Imaging, I have personally reviewed:    Telemetry, personally reviewed:
HOSPITALIST ATTENDING PROGRESS NOTE    Chart and meds reviewed.  Patient seen and examined.    Subjective:  Patient seen this morning, reports that his stools cleared following completion of MoviPrep.  Seen by GI this morning and recommended for inpatient colonoscopy, patient is amenable.    All other systems reviewed and found to be negative with the exception of what has been described above.    MEDICATIONS  (STANDING):  atorvastatin 10 milliGRAM(s) Oral at bedtime  fenofibrate Tablet 145 milliGRAM(s) Oral daily    MEDICATIONS  (PRN):      VITALS:  T(F): 97.8 (05-04-25 @ 00:09), Max: 97.8 (05-04-25 @ 00:09)  HR: 73 (05-04-25 @ 00:09) (73 - 78)  BP: 114/54 (05-04-25 @ 00:09) (114/54 - 130/80)  RR: 18 (05-04-25 @ 00:09) (18 - 18)  SpO2: 97% (05-04-25 @ 00:09) (97% - 100%)  Wt(kg): --    I&O's Summary      CAPILLARY BLOOD GLUCOSE          PHYSICAL EXAM:  Gen: NAD, comfortable, AA&Ox4  HEENT: head atrumatic and normocephalic, EOMI  CVS: +s1, s2, regular rate and rhythm, no murmurs, rubs or gallops, no thrill, normal PMI  Pulmonary: normal respiratory effort, clear to auscultation b/l, no wheezes/crackles/rhonchi  Abdomen: soft, non-tender, non-distended, +bowel sounds in all 4 quadrants, no masses noted, no guarding or rigidity   Extremities: no pedal edema, pedal pulses palpable  Skin: nl warm and dry, no wounds   Neuro: answering questions appropriately, face symmetric      LABS:                            9.8    8.33  )-----------( 243      ( 04 May 2025 08:53 )             29.0     05-04    139  |  108  |  10  ----------------------------<  163[H]  3.3[L]   |  22  |  1.04    Ca    8.2[L]      04 May 2025 08:53              Urinalysis Basic - ( 04 May 2025 08:53 )    Color: x / Appearance: x / SG: x / pH: x  Gluc: 163 mg/dL / Ketone: x  / Bili: x / Urobili: x   Blood: x / Protein: x / Nitrite: x   Leuk Esterase: x / RBC: x / WBC x   Sq Epi: x / Non Sq Epi: x / Bacteria: x              CULTURES:      Additional results/Imaging, I have personally reviewed:    Telemetry, personally reviewed:

## 2025-05-06 ENCOUNTER — TRANSCRIPTION ENCOUNTER (OUTPATIENT)
Age: 70
End: 2025-05-06

## 2025-05-06 VITALS
DIASTOLIC BLOOD PRESSURE: 62 MMHG | HEART RATE: 60 BPM | RESPIRATION RATE: 18 BRPM | SYSTOLIC BLOOD PRESSURE: 142 MMHG | TEMPERATURE: 98 F | OXYGEN SATURATION: 98 %

## 2025-05-06 DIAGNOSIS — Z78.9 OTHER SPECIFIED HEALTH STATUS: ICD-10-CM

## 2025-05-06 LAB
ANION GAP SERPL CALC-SCNC: 5 MMOL/L — SIGNIFICANT CHANGE UP (ref 5–17)
BUN SERPL-MCNC: 6 MG/DL — LOW (ref 7–23)
CALCIUM SERPL-MCNC: 8.2 MG/DL — LOW (ref 8.5–10.1)
CHLORIDE SERPL-SCNC: 111 MMOL/L — HIGH (ref 96–108)
CO2 SERPL-SCNC: 25 MMOL/L — SIGNIFICANT CHANGE UP (ref 22–31)
CREAT SERPL-MCNC: 0.85 MG/DL — SIGNIFICANT CHANGE UP (ref 0.5–1.3)
EGFR: 93 ML/MIN/1.73M2 — SIGNIFICANT CHANGE UP
EGFR: 93 ML/MIN/1.73M2 — SIGNIFICANT CHANGE UP
GLUCOSE SERPL-MCNC: 114 MG/DL — HIGH (ref 70–99)
HCT VFR BLD CALC: 24.6 % — LOW (ref 39–50)
HGB BLD-MCNC: 8.4 G/DL — LOW (ref 13–17)
MCHC RBC-ENTMCNC: 32.7 PG — SIGNIFICANT CHANGE UP (ref 27–34)
MCHC RBC-ENTMCNC: 34.1 G/DL — SIGNIFICANT CHANGE UP (ref 32–36)
MCV RBC AUTO: 95.7 FL — SIGNIFICANT CHANGE UP (ref 80–100)
NRBC # BLD AUTO: 0.02 K/UL — HIGH (ref 0–0)
NRBC # FLD: 0.02 K/UL — HIGH (ref 0–0)
NRBC BLD AUTO-RTO: 0 /100 WBCS — SIGNIFICANT CHANGE UP (ref 0–0)
PLATELET # BLD AUTO: 202 K/UL — SIGNIFICANT CHANGE UP (ref 150–400)
PMV BLD: 10.5 FL — SIGNIFICANT CHANGE UP (ref 7–13)
POTASSIUM SERPL-MCNC: 4 MMOL/L — SIGNIFICANT CHANGE UP (ref 3.5–5.3)
POTASSIUM SERPL-SCNC: 4 MMOL/L — SIGNIFICANT CHANGE UP (ref 3.5–5.3)
RBC # BLD: 2.57 M/UL — LOW (ref 4.2–5.8)
RBC # FLD: 13.5 % — SIGNIFICANT CHANGE UP (ref 10.3–14.5)
SODIUM SERPL-SCNC: 141 MMOL/L — SIGNIFICANT CHANGE UP (ref 135–145)
WBC # BLD: 8.36 K/UL — SIGNIFICANT CHANGE UP (ref 3.8–10.5)
WBC # FLD AUTO: 8.36 K/UL — SIGNIFICANT CHANGE UP (ref 3.8–10.5)

## 2025-05-06 PROCEDURE — 99239 HOSP IP/OBS DSCHRG MGMT >30: CPT

## 2025-05-06 RX ADMIN — FENOFIBRATE 145 MILLIGRAM(S): 160 TABLET ORAL at 10:18

## 2025-05-06 NOTE — DISCHARGE NOTE PROVIDER - HOSPITAL COURSE
69-year-old male past medical history notable for diverticulitis followed by colonic resection in 2002, hypertension, hyperlipidemia, vitamin D deficiency, osteoarthritis of the knee pending eventual knee replacement, lumbar stenosis presents for 1 day history of acute onset hematochezia. Patient admitted to medicine for further workup and treatment of presumptive diverticular bleed.    While admitted, patient was evaluated by GI and went for colonoscopy 5/5/2025.  It was notable for extensive diverticuli but no evidence of any active bleeding.  Patient was transfused 2 units packed red blood cells for symptomatic anemia with improvement in symptoms afterwards.  Patient to follow-up with gastroenterology as an outpatient.  Patient medically stable for discharge to home 5/6/2025.    #Lower GI bleed, presumptive diverticular bleed  #Diverticulosis with history diverticulitis s/p sigmoid resection 2002  – Patient with 1 day history of acute onset bright red blood per rectum, has never had GI bleed in the past  – colonoscopy for routine screening performed approximately 3 weeks ago without any acute findings per patient, patient does not have an outpatient gastroenterologist  – On admission, hemoglobin 12.4, hematocrit 37.1 (previous labs September 2023 showing hemoglobin 14.3, hematocrit 41.5)  – Presently patient hemodynamically stable with blood pressure 120s/70s, initially slightly tachycardic now heart rate 80s  – CT abdomen pelvis with IV contrast performed showing acute GI bleeding at the splenic flexure/proximal descending colon, could be small diverticulum with stranding at proximal descending colon  – IR consulted, recommending conservative management with fluid resuscitation and blood if needed for now  – Patient without resting tachycardia or hypotension at this time, will maintain on remote telemetry for now  – Maintain on clear liquid diet for now  – GI consulted by ED, plan for bowel prep to a certain resolution of bleeding and clear colon of old blood products  – Possible colonoscopy inpatient versus outpatient pending further clinical course  – Maintain active type and screen  – Next H/H for midnight 5/2/2025, if precipitous drop or hemodynamic instability, stat CTA for evaluation for IR embolization     #Alcohol use disorder  – Patient reports he drinks approximately 6–7 beers a day  – Patient reports he has never had any history of alcohol withdrawal or DTs in the past  – Notably patient has not been sober in years and admits to drinking daily  – States that approximately 4 years prior he was sober from alcohol for 3-4 days without any evidence of withdrawal  – Given patient hemodynamically stable for now and without evidence of withdrawal, will maintain on CIWA for monitoring for now    #Hypertension  – Hold home lisinopril 30 mg and Norvasc 5 mg for now given diverticular bleed    #Hyperlipidemia  – Continue with home atorvastatin 10 mg  – Continue with home fenofibrate 145 mg once daily    #Vitamin D deficiency  #Vitamin C deficiency  – Resume ascorbic acid 500 mg daily and ergocalciferol 1.25 mg once weekly on discharge    GI PPx: None   DVT PPx: SCDs given diverticular bleed     F: PRN   E: 4/3/2  N: clear liquid diet   A: as tolerated     FULL CODE  HCP: Yolanda Rockwell (wife) [phone]

## 2025-05-06 NOTE — DISCHARGE NOTE NURSING/CASE MANAGEMENT/SOCIAL WORK - FINANCIAL ASSISTANCE
[facility] provides services at a reduced cost to those who are determined to be eligible through [facility]’s financial assistance program. Information regarding [facility]’s financial assistance program can be found by going to [email or web address] or by calling [phone].

## 2025-05-06 NOTE — DISCHARGE NOTE PROVIDER - PROVIDER TOKENS
PROVIDER:[TOKEN:[[id number]],FOLLOWUP:[2 weeks],ESTABLISHEDPATIENT:[T]],PROVIDER:[TOKEN:[[id number]],FOLLOWUP:[2 weeks]]

## 2025-05-06 NOTE — DISCHARGE NOTE PROVIDER - NSDCMRMEDTOKEN_GEN_ALL_CORE_FT
amLODIPine 5 mg oral tablet: 1 tab(s) orally once a day  ascorbic acid 500 mg oral tablet: 1 tab(s) orally once a day  atorvastatin 10 mg oral tablet: 1 tab(s) orally once a day  ergocalciferol 1.25 mg (50,000 intl units) oral capsule: 1 cap(s) orally once a week  fenofibrate 145 mg oral tablet: 1 tab(s) orally  lisinopril 30 mg oral tablet: 1 tab(s) orally once a day

## 2025-05-06 NOTE — DISCHARGE NOTE PROVIDER - CARE PROVIDER_API CALL
Kenyon Smiley  Gastroenterology  [id number] [place] [address], [address]  [place] [address]  Phone: [phone]  Fax: [phone]  Established Patient  Follow Up Time: 2 weeks    Arsh Casanova  Gastroenterology  [address] [place] [address]  Phone: [phone]  Fax: [phone]  Follow Up Time: 2 weeks

## 2025-05-06 NOTE — DISCHARGE NOTE PROVIDER - ATTENDING DISCHARGE PHYSICAL EXAMINATION:
Gen: NAD, comfortable, AA&Ox4  HEENT: head atrumatic and normocephalic, EOMI  CVS: +s1, s2, regular rate and rhythm, no murmurs, rubs or gallops, no thrill, normal PMI  Pulmonary: normal respiratory effort, clear to auscultation b/l, no wheezes/crackles/rhonchi  Abdomen: soft, non-tender, non-distended, +bowel sounds in all 4 quadrants, no masses noted, no guarding or rigidity   Extremities: no pedal edema, pedal pulses palpable  Skin: nl warm and dry, no wounds   Neuro: answering questions appropriately, face symmetric

## 2025-05-06 NOTE — DISCHARGE NOTE PROVIDER - NSDCCPCAREPLAN_GEN_ALL_CORE_FT
PRINCIPAL DISCHARGE DIAGNOSIS  Diagnosis: Lower GI bleeding  Assessment and Plan of Treatment: You were found to have significant bleeding from your rectum on admission.  As previously discussed this was likely in the setting of a diverticular bleed which was found on your descending colon on your imaging.  Given that you are still having bleeding, we reimaged you with a CT scan showing no further bleeding.  After you fell and you are having symptoms of lightheadedness and dizziness you went for colonoscopy.  As previously discussed, your colonoscopy was notable for extensive diverticuli without any evidence of active bleeding.  Your previous surgical site from your colonic resection was also examined and was found to be normal.  Please follow-up with GI on discharge for further management.

## 2025-05-06 NOTE — DISCHARGE NOTE NURSING/CASE MANAGEMENT/SOCIAL WORK - PATIENT PORTAL LINK FT
You can access the FollowMyHealth Patient Portal offered by [facility] by registering at the following website: [email or web address]. By joining [employer]’s FollowMyHealth portal, you will also be able to view your health information using other applications (apps) compatible with our system.

## 2025-05-06 NOTE — DISCHARGE NOTE PROVIDER - NSDCFUSCHEDAPPT_GEN_ALL_CORE_FT
[facility]  ORTHOSURG [address] [facility]  Scheduled Appointment: 05/22/2025

## 2025-05-10 ENCOUNTER — INPATIENT (INPATIENT)
Facility: HOSPITAL | Age: 70
LOS: 1 days | Discharge: HOME | DRG: 379 | End: 2025-05-12
Attending: INTERNAL MEDICINE | Admitting: STUDENT IN AN ORGANIZED HEALTH CARE EDUCATION/TRAINING PROGRAM
Payer: MEDICARE

## 2025-05-10 VITALS
DIASTOLIC BLOOD PRESSURE: 93 MMHG | HEIGHT: 72 IN | RESPIRATION RATE: 18 BRPM | TEMPERATURE: 98 F | WEIGHT: 169.98 LBS | SYSTOLIC BLOOD PRESSURE: 106 MMHG | OXYGEN SATURATION: 100 % | HEART RATE: 76 BPM

## 2025-05-10 DIAGNOSIS — K92.2 GASTROINTESTINAL HEMORRHAGE, UNSPECIFIED: ICD-10-CM

## 2025-05-10 LAB
ALBUMIN SERPL ELPH-MCNC: 2.4 G/DL — LOW (ref 3.3–5)
ALBUMIN SERPL ELPH-MCNC: 2.4 G/DL — LOW (ref 3.3–5)
ALP SERPL-CCNC: 67 U/L — SIGNIFICANT CHANGE UP (ref 40–120)
ALP SERPL-CCNC: 70 U/L — SIGNIFICANT CHANGE UP (ref 40–120)
ALT FLD-CCNC: 24 U/L — SIGNIFICANT CHANGE UP (ref 12–78)
ALT FLD-CCNC: 27 U/L — SIGNIFICANT CHANGE UP (ref 12–78)
ANION GAP SERPL CALC-SCNC: 5 MMOL/L — SIGNIFICANT CHANGE UP (ref 5–17)
ANION GAP SERPL CALC-SCNC: 7 MMOL/L — SIGNIFICANT CHANGE UP (ref 5–17)
ANION GAP SERPL CALC-SCNC: 8 MMOL/L — SIGNIFICANT CHANGE UP (ref 5–17)
APTT BLD: 28.6 SEC — SIGNIFICANT CHANGE UP (ref 26.1–36.8)
AST SERPL-CCNC: 24 U/L — SIGNIFICANT CHANGE UP (ref 15–37)
AST SERPL-CCNC: 25 U/L — SIGNIFICANT CHANGE UP (ref 15–37)
BASOPHILS # BLD AUTO: 0.03 K/UL — SIGNIFICANT CHANGE UP (ref 0–0.2)
BASOPHILS NFR BLD AUTO: 0.4 % — SIGNIFICANT CHANGE UP (ref 0–2)
BILIRUB DIRECT SERPL-MCNC: 0.1 MG/DL — SIGNIFICANT CHANGE UP (ref 0–0.3)
BILIRUB INDIRECT FLD-MCNC: 0.3 MG/DL — SIGNIFICANT CHANGE UP (ref 0.2–1)
BILIRUB SERPL-MCNC: 0.2 MG/DL — SIGNIFICANT CHANGE UP (ref 0.2–1.2)
BILIRUB SERPL-MCNC: 0.4 MG/DL — SIGNIFICANT CHANGE UP (ref 0.2–1.2)
BLD GP AB SCN SERPL QL: SIGNIFICANT CHANGE UP
BUN SERPL-MCNC: 11 MG/DL — SIGNIFICANT CHANGE UP (ref 7–23)
BUN SERPL-MCNC: 12 MG/DL — SIGNIFICANT CHANGE UP (ref 7–23)
BUN SERPL-MCNC: 13 MG/DL — SIGNIFICANT CHANGE UP (ref 7–23)
CALCIUM SERPL-MCNC: 8.1 MG/DL — LOW (ref 8.5–10.1)
CALCIUM SERPL-MCNC: 8.1 MG/DL — LOW (ref 8.5–10.1)
CALCIUM SERPL-MCNC: 8.3 MG/DL — LOW (ref 8.5–10.1)
CHLORIDE SERPL-SCNC: 111 MMOL/L — HIGH (ref 96–108)
CHLORIDE SERPL-SCNC: 111 MMOL/L — HIGH (ref 96–108)
CHLORIDE SERPL-SCNC: 112 MMOL/L — HIGH (ref 96–108)
CO2 SERPL-SCNC: 22 MMOL/L — SIGNIFICANT CHANGE UP (ref 22–31)
CO2 SERPL-SCNC: 24 MMOL/L — SIGNIFICANT CHANGE UP (ref 22–31)
CO2 SERPL-SCNC: 24 MMOL/L — SIGNIFICANT CHANGE UP (ref 22–31)
CREAT SERPL-MCNC: 0.76 MG/DL — SIGNIFICANT CHANGE UP (ref 0.5–1.3)
CREAT SERPL-MCNC: 0.78 MG/DL — SIGNIFICANT CHANGE UP (ref 0.5–1.3)
CREAT SERPL-MCNC: 0.89 MG/DL — SIGNIFICANT CHANGE UP (ref 0.5–1.3)
EGFR: 92 ML/MIN/1.73M2 — SIGNIFICANT CHANGE UP
EGFR: 92 ML/MIN/1.73M2 — SIGNIFICANT CHANGE UP
EGFR: 96 ML/MIN/1.73M2 — SIGNIFICANT CHANGE UP
EGFR: 96 ML/MIN/1.73M2 — SIGNIFICANT CHANGE UP
EGFR: 97 ML/MIN/1.73M2 — SIGNIFICANT CHANGE UP
EGFR: 97 ML/MIN/1.73M2 — SIGNIFICANT CHANGE UP
EOSINOPHIL # BLD AUTO: 0.49 K/UL — SIGNIFICANT CHANGE UP (ref 0–0.5)
EOSINOPHIL NFR BLD AUTO: 6.7 % — HIGH (ref 0–6)
GLUCOSE SERPL-MCNC: 113 MG/DL — HIGH (ref 70–99)
GLUCOSE SERPL-MCNC: 89 MG/DL — SIGNIFICANT CHANGE UP (ref 70–99)
GLUCOSE SERPL-MCNC: 92 MG/DL — SIGNIFICANT CHANGE UP (ref 70–99)
HCT VFR BLD CALC: 21.6 % — LOW (ref 39–50)
HCT VFR BLD CALC: 21.9 % — LOW (ref 39–50)
HCT VFR BLD CALC: 22.5 % — LOW (ref 39–50)
HCT VFR BLD CALC: 22.6 % — LOW (ref 39–50)
HCT VFR BLD CALC: 22.8 % — LOW (ref 39–50)
HGB BLD-MCNC: 7.1 G/DL — LOW (ref 13–17)
HGB BLD-MCNC: 7.2 G/DL — LOW (ref 13–17)
HGB BLD-MCNC: 7.4 G/DL — LOW (ref 13–17)
IMM GRANULOCYTES # BLD AUTO: 0.03 K/UL — SIGNIFICANT CHANGE UP (ref 0–0.07)
IMM GRANULOCYTES NFR BLD AUTO: 0.4 % — SIGNIFICANT CHANGE UP (ref 0–0.9)
INR BLD: 0.96 RATIO — SIGNIFICANT CHANGE UP (ref 0.85–1.16)
LYMPHOCYTES # BLD AUTO: 1.32 K/UL — SIGNIFICANT CHANGE UP (ref 1–3.3)
LYMPHOCYTES NFR BLD AUTO: 18.1 % — SIGNIFICANT CHANGE UP (ref 13–44)
MAGNESIUM SERPL-MCNC: 2 MG/DL — SIGNIFICANT CHANGE UP (ref 1.6–2.6)
MAGNESIUM SERPL-MCNC: 2 MG/DL — SIGNIFICANT CHANGE UP (ref 1.6–2.6)
MCHC RBC-ENTMCNC: 31.2 PG — SIGNIFICANT CHANGE UP (ref 27–34)
MCHC RBC-ENTMCNC: 31.2 PG — SIGNIFICANT CHANGE UP (ref 27–34)
MCHC RBC-ENTMCNC: 31.3 PG — SIGNIFICANT CHANGE UP (ref 27–34)
MCHC RBC-ENTMCNC: 32.4 PG — SIGNIFICANT CHANGE UP (ref 27–34)
MCHC RBC-ENTMCNC: 32.5 G/DL — SIGNIFICANT CHANGE UP (ref 32–36)
MCHC RBC-ENTMCNC: 32.9 G/DL — SIGNIFICANT CHANGE UP (ref 32–36)
MCV RBC AUTO: 94.9 FL — SIGNIFICANT CHANGE UP (ref 80–100)
MCV RBC AUTO: 95.2 FL — SIGNIFICANT CHANGE UP (ref 80–100)
MCV RBC AUTO: 96.2 FL — SIGNIFICANT CHANGE UP (ref 80–100)
MCV RBC AUTO: 98.6 FL — SIGNIFICANT CHANGE UP (ref 80–100)
MONOCYTES # BLD AUTO: 0.6 K/UL — SIGNIFICANT CHANGE UP (ref 0–0.9)
MONOCYTES NFR BLD AUTO: 8.2 % — SIGNIFICANT CHANGE UP (ref 2–14)
NEUTROPHILS # BLD AUTO: 4.84 K/UL — SIGNIFICANT CHANGE UP (ref 1.8–7.4)
NEUTROPHILS NFR BLD AUTO: 66.2 % — SIGNIFICANT CHANGE UP (ref 43–77)
NRBC # BLD AUTO: 0 K/UL — SIGNIFICANT CHANGE UP (ref 0–0)
NRBC # FLD: 0 K/UL — SIGNIFICANT CHANGE UP (ref 0–0)
NRBC BLD AUTO-RTO: 0 /100 WBCS — SIGNIFICANT CHANGE UP (ref 0–0)
PHOSPHATE SERPL-MCNC: 3.4 MG/DL — SIGNIFICANT CHANGE UP (ref 2.5–4.5)
PHOSPHATE SERPL-MCNC: 3.6 MG/DL — SIGNIFICANT CHANGE UP (ref 2.5–4.5)
PLATELET # BLD AUTO: 206 K/UL — SIGNIFICANT CHANGE UP (ref 150–400)
PLATELET # BLD AUTO: 210 K/UL — SIGNIFICANT CHANGE UP (ref 150–400)
PLATELET # BLD AUTO: 219 K/UL — SIGNIFICANT CHANGE UP (ref 150–400)
PLATELET # BLD AUTO: 229 K/UL — SIGNIFICANT CHANGE UP (ref 150–400)
PMV BLD: 10 FL — SIGNIFICANT CHANGE UP (ref 7–13)
PMV BLD: 10.2 FL — SIGNIFICANT CHANGE UP (ref 7–13)
PMV BLD: 10.3 FL — SIGNIFICANT CHANGE UP (ref 7–13)
PMV BLD: 9.9 FL — SIGNIFICANT CHANGE UP (ref 7–13)
POTASSIUM SERPL-MCNC: 3.7 MMOL/L — SIGNIFICANT CHANGE UP (ref 3.5–5.3)
POTASSIUM SERPL-MCNC: 3.9 MMOL/L — SIGNIFICANT CHANGE UP (ref 3.5–5.3)
POTASSIUM SERPL-MCNC: 4 MMOL/L — SIGNIFICANT CHANGE UP (ref 3.5–5.3)
POTASSIUM SERPL-SCNC: 3.7 MMOL/L — SIGNIFICANT CHANGE UP (ref 3.5–5.3)
POTASSIUM SERPL-SCNC: 3.9 MMOL/L — SIGNIFICANT CHANGE UP (ref 3.5–5.3)
POTASSIUM SERPL-SCNC: 4 MMOL/L — SIGNIFICANT CHANGE UP (ref 3.5–5.3)
PROT SERPL-MCNC: 5 GM/DL — LOW (ref 6–8.3)
PROT SERPL-MCNC: 5 GM/DL — LOW (ref 6–8.3)
PROTHROM AB SERPL-ACNC: 11.1 SEC — SIGNIFICANT CHANGE UP (ref 9.9–13.4)
RBC # BLD: 2.22 M/UL — LOW (ref 4.2–5.8)
RBC # BLD: 2.27 M/UL — LOW (ref 4.2–5.8)
RBC # BLD: 2.37 M/UL — LOW (ref 4.2–5.8)
RBC # BLD: 2.37 M/UL — LOW (ref 4.2–5.8)
RBC # FLD: 14.2 % — SIGNIFICANT CHANGE UP (ref 10.3–14.5)
RBC # FLD: 15.5 % — HIGH (ref 10.3–14.5)
RBC # FLD: 15.8 % — HIGH (ref 10.3–14.5)
RBC # FLD: 15.9 % — HIGH (ref 10.3–14.5)
SODIUM SERPL-SCNC: 140 MMOL/L — SIGNIFICANT CHANGE UP (ref 135–145)
SODIUM SERPL-SCNC: 142 MMOL/L — SIGNIFICANT CHANGE UP (ref 135–145)
SODIUM SERPL-SCNC: 142 MMOL/L — SIGNIFICANT CHANGE UP (ref 135–145)
WBC # BLD: 5.7 K/UL — SIGNIFICANT CHANGE UP (ref 3.8–10.5)
WBC # BLD: 6.4 K/UL — SIGNIFICANT CHANGE UP (ref 3.8–10.5)
WBC # BLD: 6.59 K/UL — SIGNIFICANT CHANGE UP (ref 3.8–10.5)
WBC # BLD: 7.31 K/UL — SIGNIFICANT CHANGE UP (ref 3.8–10.5)
WBC # FLD AUTO: 5.7 K/UL — SIGNIFICANT CHANGE UP (ref 3.8–10.5)
WBC # FLD AUTO: 6.4 K/UL — SIGNIFICANT CHANGE UP (ref 3.8–10.5)
WBC # FLD AUTO: 6.59 K/UL — SIGNIFICANT CHANGE UP (ref 3.8–10.5)
WBC # FLD AUTO: 7.31 K/UL — SIGNIFICANT CHANGE UP (ref 3.8–10.5)

## 2025-05-10 PROCEDURE — 36415 COLL VENOUS BLD VENIPUNCTURE: CPT

## 2025-05-10 PROCEDURE — 85025 COMPLETE CBC W/AUTO DIFF WBC: CPT

## 2025-05-10 PROCEDURE — 80048 BASIC METABOLIC PNL TOTAL CA: CPT

## 2025-05-10 PROCEDURE — 12345: CPT | Mod: NC

## 2025-05-10 PROCEDURE — 83735 ASSAY OF MAGNESIUM: CPT

## 2025-05-10 PROCEDURE — 80076 HEPATIC FUNCTION PANEL: CPT

## 2025-05-10 PROCEDURE — 85018 HEMOGLOBIN: CPT

## 2025-05-10 PROCEDURE — 99285 EMERGENCY DEPT VISIT HI MDM: CPT

## 2025-05-10 PROCEDURE — P9016: CPT

## 2025-05-10 PROCEDURE — 80053 COMPREHEN METABOLIC PANEL: CPT

## 2025-05-10 PROCEDURE — 85027 COMPLETE CBC AUTOMATED: CPT

## 2025-05-10 PROCEDURE — 85014 HEMATOCRIT: CPT

## 2025-05-10 PROCEDURE — 74178 CT ABD&PLV WO CNTR FLWD CNTR: CPT | Mod: 26

## 2025-05-10 PROCEDURE — 36430 TRANSFUSION BLD/BLD COMPNT: CPT

## 2025-05-10 PROCEDURE — 99222 1ST HOSP IP/OBS MODERATE 55: CPT

## 2025-05-10 PROCEDURE — 84100 ASSAY OF PHOSPHORUS: CPT

## 2025-05-10 PROCEDURE — 93010 ELECTROCARDIOGRAM REPORT: CPT

## 2025-05-10 PROCEDURE — 99223 1ST HOSP IP/OBS HIGH 75: CPT

## 2025-05-10 RX ORDER — ATORVASTATIN CALCIUM 80 MG/1
10 TABLET, FILM COATED ORAL AT BEDTIME
Refills: 0 | Status: DISCONTINUED | OUTPATIENT
Start: 2025-05-10 | End: 2025-05-12

## 2025-05-10 RX ORDER — ERGOCALCIFEROL 1.25 MG/1
1 CAPSULE ORAL
Refills: 0 | DISCHARGE

## 2025-05-10 RX ORDER — AMLODIPINE BESYLATE 10 MG/1
1 TABLET ORAL
Refills: 0 | DISCHARGE

## 2025-05-10 RX ORDER — LISINOPRIL 5 MG/1
1 TABLET ORAL
Refills: 0 | DISCHARGE

## 2025-05-10 RX ORDER — FOLIC ACID 1 MG/1
1 TABLET ORAL DAILY
Refills: 0 | Status: DISCONTINUED | OUTPATIENT
Start: 2025-05-10 | End: 2025-05-12

## 2025-05-10 RX ORDER — DIAZEPAM 2 MG/1
5 TABLET ORAL
Refills: 0 | Status: DISCONTINUED | OUTPATIENT
Start: 2025-05-10 | End: 2025-05-12

## 2025-05-10 RX ORDER — FENOFIBRATE 160 MG/1
145 TABLET ORAL DAILY
Refills: 0 | Status: DISCONTINUED | OUTPATIENT
Start: 2025-05-10 | End: 2025-05-12

## 2025-05-10 RX ORDER — DIAZEPAM 2 MG/1
10 TABLET ORAL
Refills: 0 | Status: DISCONTINUED | OUTPATIENT
Start: 2025-05-10 | End: 2025-05-12

## 2025-05-10 RX ORDER — B1/B2/B3/B5/B6/B12/VIT C/FOLIC 500-0.5 MG
1 TABLET ORAL DAILY
Refills: 0 | Status: DISCONTINUED | OUTPATIENT
Start: 2025-05-10 | End: 2025-05-12

## 2025-05-10 RX ORDER — ATORVASTATIN CALCIUM 80 MG/1
1 TABLET, FILM COATED ORAL
Refills: 0 | DISCHARGE

## 2025-05-10 RX ORDER — FENOFIBRATE 160 MG/1
1 TABLET ORAL
Refills: 0 | DISCHARGE

## 2025-05-10 RX ADMIN — FENOFIBRATE 145 MILLIGRAM(S): 160 TABLET ORAL at 13:18

## 2025-05-10 RX ADMIN — Medication 1 TABLET(S): at 12:36

## 2025-05-10 RX ADMIN — FOLIC ACID 1 MILLIGRAM(S): 1 TABLET ORAL at 12:37

## 2025-05-10 RX ADMIN — ATORVASTATIN CALCIUM 10 MILLIGRAM(S): 80 TABLET, FILM COATED ORAL at 21:06

## 2025-05-10 RX ADMIN — Medication 100 MILLIGRAM(S): at 12:38

## 2025-05-10 NOTE — ED ADULT NURSE REASSESSMENT NOTE - NS ED NURSE REASSESS COMMENT FT1
attempted to give report to 2N. 2N concerned that CIWA ordered for monitoring without PRN orders for ativan. Dr. Zaidi called and made aware. meds to be ordered.

## 2025-05-10 NOTE — H&P ADULT - NSHPPHYSICALEXAM_GEN_ALL_CORE
Vital Signs Last 24 Hrs  T(C): 37 (10 May 2025 03:15), Max: 37 (10 May 2025 03:15)  T(F): 98.6 (10 May 2025 03:15), Max: 98.6 (10 May 2025 03:15)  HR: 68 (10 May 2025 03:15) (68 - 76)  BP: 126/59 (10 May 2025 03:15) (106/93 - 126/59)  BP(mean): 78 (10 May 2025 03:15) (78 - 78)  RR: 15 (10 May 2025 03:15) (15 - 18)  SpO2: 100% (10 May 2025 03:15) (100% - 100%)    Parameters below as of 10 May 2025 03:15  Patient On (Oxygen Delivery Method): room air    GENERAL: NAD  HEAD:  Atraumatic  EYES: EOMI  ENT: Moist mucous membranes  NECK: Supple  CHEST/LUNG: Unlabored respirations  HEART: Regular rate and rhythm  ABDOMEN: Soft, Nontender  EXTREMITIES:   No clubbing, cyanosis, or edema  NERVOUS SYSTEM:  Alert & Oriented X3, speech clear.   MSK: FROM all 4 extremities

## 2025-05-10 NOTE — ED ADULT NURSE NOTE - CHIEF COMPLAINT QUOTE
Pt BIBEMS c/o rectal bleeding. Per pt, he had a bowel movement with red blood laced in the stool. Pt felt lightheaded after passing BM, but did not syncopize. AO x 4. Hx of diverticulitis, HTN. - blood thinners. Pt taken for STAT EKG.

## 2025-05-10 NOTE — H&P ADULT - HISTORY OF PRESENT ILLNESS
Patient is a 68 y/o M with a PMH of HLD, HTN, diverticulitis s/p colon resection (2002) and spinal stenosis w/ chronic NSAID use presenting to  ED on 5/10/25 for rectal bleeding.       He was recently admitted with a diverticular bleed and required 2 units of pRBCs. Colonoscopy from 5/5/25 showed a large amount of red blood and clots in the left colon which was lavaged and cleared.       Upon arrival to the ED, vitals were 106/93 HR 76 RR 18 T 98.1F and SpO2 of 100% on room air.  Labs significant for Hgb of 7.2. CT abdomen and pelvis with and without IV contrast did not show active GI bleed.     Currently, patient denies fevers, chills, nausea, vomiting, shortness of breath, palpitations, chest pain and headache. Feels a bit dizzy.      Patient is a 68 y/o M with a PMH of HLD, HTN, diverticulitis s/p colon resection (2002) and spinal stenosis w/ chronic NSAID use presenting to  ED on 5/10/25 for rectal bleeding. He was recently admitted with a diverticular bleed and required 2 units of pRBCs. Colonoscopy from 5/5/25 showed a large amount of red blood and clots in the left colon which was lavaged and cleared. Since being discharged on the 5/6/25, tonight was his first  large bowel movement that he had. He had a smaller bowel movement the morning of admission but it was not bloody. He report tonight not only was his poop streaked with blood but that there was also blood in the toilet.       Upon arrival to the ED, vitals were 106/93 HR 76 RR 18 T 98.1F and SpO2 of 100% on room air.  Labs significant for Hgb of 7.2. CT abdomen and pelvis with and without IV contrast did not show active GI bleed.   Currently, patient denies fevers, chills, nausea, vomiting, shortness of breath, palpitations, chest pain and headache. Feels a bit dizzy.

## 2025-05-10 NOTE — ED ADULT NURSE NOTE - NSFALLHARMRISKINTERV_ED_ALL_ED
Assistance OOB with selected safe patient handling equipment if applicable/Communicate risk of Fall with Harm to all staff, patient, and family/Provide patient with walking aids/Provide visual cue: red socks, yellow wristband, yellow gown, etc/Reinforce activity limits and safety measures with patient and family/Bed in lowest position, wheels locked, appropriate side rails in place/Call bell, personal items and telephone in reach/Instruct patient to call for assistance before getting out of bed/chair/stretcher/Non-slip footwear applied when patient is off stretcher/[place] to call system/Physically safe environment - no spills, clutter or unnecessary equipment/Purposeful Proactive Rounding/Room/bathroom lighting operational, light cord in reach

## 2025-05-10 NOTE — DIETITIAN INITIAL EVALUATION ADULT - OTHER INFO
Patient is a 68 y/o M with a PMH of HLD, HTN, diverticulitis s/p colon resection (2002) and spinal stenosis w/ chronic NSAID use presenting to  ED on 5/10/25 for rectal bleeding. He was recently admitted with a diverticular bleed and required 2 units of pRBCs. Colonoscopy from 5/5/25 showed a large amount of red blood and clots in the left colon which was lavaged and cleared. Since being discharged on the 5/6/25, tonight was his first  large bowel movement that he had. He had a smaller bowel movement the morning of admission but it was not bloody. He report tonight not only was his poop streaked with blood but that there was also blood in the toilet.       Upon arrival to the ED, vitals were 106/93 HR 76 RR 18 T 98.1F and SpO2 of 100% on room air.  Labs significant for Hgb of 7.2. CT abdomen and pelvis with and without IV contrast did not show active GI bleed.   Currently, patient denies fevers, chills, nausea, vomiting, shortness of breath, palpitations, chest pain and headache. Feels a bit dizzy.     Admit dx is gastrointestinal hemorrhage  Received STAT consult, reason not specified  Pt is recent discharge 5/6  N/V resolved.  No issues chewing or swallowing  Unable to get RD bedscale weight, no scale on bed  EMR weight is 77 kg  170#  Pt reports UBW of 200#, decreased PO intake x 2 weeks  Pt appears very uncomfortable  NFPE reveals muscle wasting, fat wasting, moderate and severe  Pt on clear liquid diet, tray was next to patient,  he had not touched it but was about to.  Suggest advance diet to low fiber when medically feasible and as tolerated  Will await pt transfer to unit to follow up.  Recommendations to follow in Plan/Intervention

## 2025-05-10 NOTE — CONSULT NOTE ADULT - SUBJECTIVE AND OBJECTIVE BOX
Patient is a 69y old  Male who presents with a chief complaint of rectal bleeding (10 May 2025 08:09)      HPI:  Patient is a 70 y/o M with a PMH of HLD, HTN, diverticulitis s/p colon resection (2002) and spinal stenosis w/ chronic NSAID use presenting to  ED on 5/10/25 for rectal bleeding. He was recently admitted with a diverticular bleed and required 2 units of pRBCs. Colonoscopy from 5/5/25 showed a large amount of red blood and clots in the left colon which was lavaged and cleared. Since being discharged on the 5/6/25, tonight was his first  large bowel movement that he had. He had a smaller bowel movement the morning of admission but it was not bloody. He report tonight not only was his poop streaked with blood but that there was also blood in the toilet.       Upon arrival to the ED, vitals were 106/93 HR 76 RR 18 T 98.1F and SpO2 of 100% on room air.  Labs significant for Hgb of 7.2. CT abdomen and pelvis with and without IV contrast did not show active GI bleed.   Currently, patient denies fevers, chills, nausea, vomiting, shortness of breath, palpitations, chest pain and headache. Feels a bit dizzy.     Patient had diverticular bleeding (had colonoscopy last week). CTA on admission negative. Currently stable.      (10 May 2025 03:22)      PAST MEDICAL & SURGICAL HISTORY:  Hypertension, unspecified type      High cholesterol          MEDICATIONS  (STANDING):  atorvastatin 10 milliGRAM(s) Oral at bedtime  fenofibrate Tablet 145 milliGRAM(s) Oral daily  folic acid 1 milliGRAM(s) Oral daily  multivitamin 1 Tablet(s) Oral daily  thiamine 100 milliGRAM(s) Oral daily    MEDICATIONS  (PRN):  diazepam  Injectable 5 milliGRAM(s) IV Push every 1 hour PRN CIWA 8-14  diazepam  Injectable 10 milliGRAM(s) IV Push every 1 hour PRN CIWA 15 or greater, or seizure      Allergies    No Known Allergies    Intolerances        SOCIAL HISTORY:    FAMILY HISTORY:        Vital Signs Last 24 Hrs  T(C): 36.3 (10 May 2025 10:12), Max: 37 (10 May 2025 03:15)  T(F): 97.3 (10 May 2025 10:12), Max: 98.6 (10 May 2025 03:15)  HR: 65 (10 May 2025 10:12) (65 - 78)  BP: 134/76 (10 May 2025 10:12) (106/93 - 139/69)  BP(mean): 90 (10 May 2025 07:29) (78 - 90)  RR: 17 (10 May 2025 10:12) (14 - 18)  SpO2: 100% (10 May 2025 10:12) (96% - 100%)    Parameters below as of 10 May 2025 10:12  Patient On (Oxygen Delivery Method): room air        PHYSICAL EXAM:    Respiratory: CTAB  Cardiovascular: S1 and S2, RRR, no M/G/R  Gastrointestinal: BS+, soft, NT/ND    LABS:                        7.4    5.70  )-----------( 219      ( 10 May 2025 07:51 )             22.5     05-10    142  |  112[H]  |  12  ----------------------------<  92  4.0   |  22  |  0.78    Ca    8.1[L]      10 May 2025 07:51  Phos  3.6     05-10  Mg     2.0     05-10    TPro  5.0[L]  /  Alb  2.4[L]  /  TBili  0.2  /  DBili  x   /  AST  25  /  ALT  27  /  AlkPhos  70  05-10    PT/INR - ( 10 May 2025 00:58 )   PT: 11.1 sec;   INR: 0.96 ratio         PTT - ( 10 May 2025 00:58 )  PTT:28.6 sec  LIVER FUNCTIONS - ( 10 May 2025 00:58 )  Alb: 2.4 g/dL / Pro: 5.0 gm/dL / ALK PHOS: 70 U/L / ALT: 27 U/L / AST: 25 U/L / GGT: x             RADIOLOGY & ADDITIONAL STUDIES:

## 2025-05-10 NOTE — CONSULT NOTE ADULT - ASSESSMENT
70 yo male with history of stuttering diverticular bleed. Currently stable. No indication for repeat colonoscopy at this time. Would continue close monitoring and clear liquid diet. May need IR angiography if active bleeding.

## 2025-05-10 NOTE — ED ADULT NURSE NOTE - NSFALLRISK_ED_ALL_ED
Jocelyn reports that patient will be taking last pill of Ninlaro on Monday, 07/22.  Plan to call around that time for a toxicity assessment.  She is requesting a refill of Dexamethasone d/t patient taking last pill today.  Refill released from plan.  
Yes

## 2025-05-10 NOTE — ED PROVIDER NOTE - OBJECTIVE STATEMENT
69-year-old male not on anticoagulation with recent admission for GI bleed believed to be of diverticular source presents with bright red blood per rectum.  Episode just prior to arrival.  Patient states during last admission he required 2 units of blood and had a colonoscopy at that time.  Patient feels mildly lightheaded.

## 2025-05-10 NOTE — ED PROVIDER NOTE - CLINICAL SUMMARY MEDICAL DECISION MAKING FREE TEXT BOX
pt w/ GI bleed. will CTA, transfuse as needed. admitted to medicine for further management .Hemodynamically stable

## 2025-05-10 NOTE — PROGRESS NOTE ADULT - ASSESSMENT
70 y/o M with a PMH of HLD, HTN, diverticulitis s/p colon resection (2002) and spinal stenosis w/ chronic NSAID use presenting to  ED on 5/10/25 for rectal bleeding. He was recently admitted with a diverticular bleed and required 2 units of pRBCs. Colonoscopy from 5/5/25 showed a large amount of red blood and clots in the left colon which was lavaged and cleared.    Pt admitted with:    #Lower GI bleed   - Hgb upon admission 7.2 (was previously 8.4 on 5/6/25)  - CT abdomen and pelvis with and without IV contrast did not show active GI bleed.   - Colonoscopy from 5/5/25: Large amount of red blood and clots in the left colon; this was lavaged and cleared.   - s/p 1 unit of PRBC    - Continue to monitor H/H Q 8 hrs  - Transfuse PRN fo Hgb less than 7   - Clear liquid diet   - GI consult   advance diet 5/11  stop drinking alcohol ; it might be acting as antiplatelet    #Alcohol use disorder  – Patient reports he drinks approximately 6–7 beers a day  – Patient reports he has never had any history of alcohol withdrawal or DTs in the past  – Notably patient has not been sober in years and admits to drinking daily  CIWA with prn valium  pt advised to stop drinking alcohol ; it might be acting as antiplatelet    #Hypertension  – At home is on lisinopril 30 mg and Norvasc 5 mg - will hold for now given GI bleed bleed    #Hyperlipidemia  – Continue with home atorvastatin 10 mg  – Continue with home fenofibrate 145 mg once daily    #Protein Calorie Malnutrition   - Albumin of 2.4 upon admission   - F/u nutrition consult     #DVT ppx   - SCDs    #Code Status   - FULL CODE    POC discussed with pt, team, Dr. Perez

## 2025-05-10 NOTE — DIETITIAN INITIAL EVALUATION ADULT - PERTINENT LABORATORY DATA
05-10    140  |  111[H]  |  13  ----------------------------<  113[H]  3.9   |  24  |  0.89    Ca    8.3[L]      10 May 2025 00:58    TPro  5.0[L]  /  Alb  2.4[L]  /  TBili  0.2  /  DBili  x   /  AST  25  /  ALT  27  /  AlkPhos  70  05-10

## 2025-05-10 NOTE — ED ADULT TRIAGE NOTE - CHIEF COMPLAINT QUOTE
Pt BIBEMS c/o rectal bleeding. Per [t Pt BIBEMS c/o rectal bleeding. Per pt, he had a bowel movement with red blood laced in the stool. Pt felt lightheaded after passing BM, but did not syncopize. AO x 4. Hx of diverticulitis, HTN. - blood thinners. Pt taken for STAT EKG.

## 2025-05-10 NOTE — DIETITIAN INITIAL EVALUATION ADULT - ADD RECOMMEND
Advance diet to low fiber when medically feasible and as tolerated  MVI w/ minerals daily to ensure 100% RDA met   Record PO intake in EMR after each meal (flowsheet, nursing.)   Add ONS if pt's po intake falls below 75% ENN   Consider adding thiamine 100 mg daily 2/2 poor PO intake/ malnutrition   Monitor bowel movements, if no BM for >3 days, consider implementing bowel regimen.  please check new weight  Will await further instructions when pt moved to unit  Nutrition recommendations to continue upon discharge.  Goal to continue to meet >/=80% ENN via tolerated route.  RD to F/U prn for changes to nutrition dc plan.   Monitor PO intake, tolerance, labs and weight.

## 2025-05-10 NOTE — ED ADULT NURSE NOTE - OBJECTIVE STATEMENT
69y male AAOx4 BIBEMS from home c/o rectal bleeding. PMH diverticulitis recently treated at . Reports having a BM with BRB throughout it. Denies fevers, chest pain, N/V/D, abdominal pain. EKG complete upon arrival. Labs sent as per MD order. Respirations are even and unlabored, in no acute distress. BREAK COVERAGE RN  69y male AAOx4 BIBEMS from home c/o rectal bleeding. PMH diverticulitis recently treated at . Reports having a BM with BRB throughout it. Denies fevers, chest pain, N/V/D, abdominal pain. EKG complete upon arrival. Labs sent as per MD order. Respirations are even and unlabored, in no acute distress.

## 2025-05-10 NOTE — PATIENT PROFILE ADULT - FALL HARM RISK - HARM RISK INTERVENTIONS

## 2025-05-10 NOTE — DIETITIAN INITIAL EVALUATION ADULT - WEIGHT (KG)
77 Erythromycin Pregnancy And Lactation Text: This medication is Pregnancy Category B and is considered safe during pregnancy. It is also excreted in breast milk.

## 2025-05-10 NOTE — DIETITIAN INITIAL EVALUATION ADULT - PERTINENT MEDS FT
MEDICATIONS  (STANDING):  atorvastatin 10 milliGRAM(s) Oral at bedtime  fenofibrate Tablet 145 milliGRAM(s) Oral daily    MEDICATIONS  (PRN):

## 2025-05-10 NOTE — H&P ADULT - SOCIAL HISTORY: ALCOHOL USE
– Patient reports he drinks approximately 6–7 beers a day  – Patient reports he has never had any history of alcohol withdrawal or DTs in the past

## 2025-05-10 NOTE — DIETITIAN INITIAL EVALUATION ADULT - ORAL INTAKE PTA/DIET HISTORY
Pt lives alone, shops and cooks for himself.  He has been eating 2 meals a day, further decrease in po intake x 2 weeks.  PO intake estimated < 50% ENN > one week.

## 2025-05-10 NOTE — H&P ADULT - ASSESSMENT
#Lower GI bleed   - Hgb upon admission 7.2 (was previously 8.4 on 5/6/25)  - CT abdomen and pelvis with and without IV contrast did not show active GI bleed.   - Colonoscopy from 5/5/25: Large amount of red blood and clots in the left colon; this was lavaged and cleared.   - Will transfuse 1 unit of pRBC  now  - Continue to monitor CBC q6   - Transfuse PRN fo Hgb less than 7   - Clear liquid diet   - F/u GI consult     #Alcohol use disorder  – Patient reports he drinks approximately 6–7 beers a day  – Patient reports he has never had any history of alcohol withdrawal or DTs in the past  – Notably patient has not been sober in years and admits to drinking daily  – Given patient hemodynamically stable for now and without evidence of withdrawal, will maintain on CIWA for monitoring for now    #Hypertension  – At home is on lisinopril 30 mg and Norvasc 5 mg - will hold for now given GI bleed bleed    #Hyperlipidemia  – Continue with home atorvastatin 10 mg  – Continue with home fenofibrate 145 mg once daily    #Protein Calorie Malnutrition   - Albumin of 2.4 upon admission   - F/u nutrition consult     #DVT ppx   - SCDs    #Code Status   - FULL CODE

## 2025-05-10 NOTE — DIETITIAN INITIAL EVALUATION ADULT - NAME AND PHONE
Emely Cristobal RDN, CDN, [facility]      [phone]   [email or web address]

## 2025-05-11 LAB
ANION GAP SERPL CALC-SCNC: 4 MMOL/L — LOW (ref 5–17)
BASOPHILS # BLD AUTO: 0.04 K/UL — SIGNIFICANT CHANGE UP (ref 0–0.2)
BASOPHILS NFR BLD AUTO: 0.7 % — SIGNIFICANT CHANGE UP (ref 0–2)
BUN SERPL-MCNC: 8 MG/DL — SIGNIFICANT CHANGE UP (ref 7–23)
CALCIUM SERPL-MCNC: 8.2 MG/DL — LOW (ref 8.5–10.1)
CHLORIDE SERPL-SCNC: 115 MMOL/L — HIGH (ref 96–108)
CO2 SERPL-SCNC: 23 MMOL/L — SIGNIFICANT CHANGE UP (ref 22–31)
CREAT SERPL-MCNC: 0.78 MG/DL — SIGNIFICANT CHANGE UP (ref 0.5–1.3)
EGFR: 96 ML/MIN/1.73M2 — SIGNIFICANT CHANGE UP
EGFR: 96 ML/MIN/1.73M2 — SIGNIFICANT CHANGE UP
EOSINOPHIL # BLD AUTO: 0.38 K/UL — SIGNIFICANT CHANGE UP (ref 0–0.5)
EOSINOPHIL NFR BLD AUTO: 6.2 % — HIGH (ref 0–6)
GLUCOSE SERPL-MCNC: 91 MG/DL — SIGNIFICANT CHANGE UP (ref 70–99)
HCT VFR BLD CALC: 23.8 % — LOW (ref 39–50)
HCT VFR BLD CALC: 24.8 % — LOW (ref 39–50)
HCT VFR BLD CALC: 25.3 % — LOW (ref 39–50)
HGB BLD-MCNC: 7.7 G/DL — LOW (ref 13–17)
HGB BLD-MCNC: 8 G/DL — LOW (ref 13–17)
HGB BLD-MCNC: 8.1 G/DL — LOW (ref 13–17)
IMM GRANULOCYTES # BLD AUTO: 0.02 K/UL — SIGNIFICANT CHANGE UP (ref 0–0.07)
IMM GRANULOCYTES NFR BLD AUTO: 0.3 % — SIGNIFICANT CHANGE UP (ref 0–0.9)
LYMPHOCYTES # BLD AUTO: 1.06 K/UL — SIGNIFICANT CHANGE UP (ref 1–3.3)
LYMPHOCYTES NFR BLD AUTO: 17.3 % — SIGNIFICANT CHANGE UP (ref 13–44)
MAGNESIUM SERPL-MCNC: 2 MG/DL — SIGNIFICANT CHANGE UP (ref 1.6–2.6)
MCHC RBC-ENTMCNC: 31.8 PG — SIGNIFICANT CHANGE UP (ref 27–34)
MCHC RBC-ENTMCNC: 32.7 G/DL — SIGNIFICANT CHANGE UP (ref 32–36)
MCV RBC AUTO: 97.3 FL — SIGNIFICANT CHANGE UP (ref 80–100)
MONOCYTES # BLD AUTO: 0.57 K/UL — SIGNIFICANT CHANGE UP (ref 0–0.9)
MONOCYTES NFR BLD AUTO: 9.3 % — SIGNIFICANT CHANGE UP (ref 2–14)
NEUTROPHILS # BLD AUTO: 4.04 K/UL — SIGNIFICANT CHANGE UP (ref 1.8–7.4)
NEUTROPHILS NFR BLD AUTO: 66.2 % — SIGNIFICANT CHANGE UP (ref 43–77)
NRBC # BLD AUTO: 0 K/UL — SIGNIFICANT CHANGE UP (ref 0–0)
NRBC # FLD: 0 K/UL — SIGNIFICANT CHANGE UP (ref 0–0)
NRBC BLD AUTO-RTO: 0 /100 WBCS — SIGNIFICANT CHANGE UP (ref 0–0)
PHOSPHATE SERPL-MCNC: 3.2 MG/DL — SIGNIFICANT CHANGE UP (ref 2.5–4.5)
PLATELET # BLD AUTO: 234 K/UL — SIGNIFICANT CHANGE UP (ref 150–400)
PMV BLD: 10.1 FL — SIGNIFICANT CHANGE UP (ref 7–13)
POTASSIUM SERPL-MCNC: 4 MMOL/L — SIGNIFICANT CHANGE UP (ref 3.5–5.3)
POTASSIUM SERPL-SCNC: 4 MMOL/L — SIGNIFICANT CHANGE UP (ref 3.5–5.3)
RBC # BLD: 2.55 M/UL — LOW (ref 4.2–5.8)
RBC # FLD: 15.7 % — HIGH (ref 10.3–14.5)
SODIUM SERPL-SCNC: 142 MMOL/L — SIGNIFICANT CHANGE UP (ref 135–145)
WBC # BLD: 6.11 K/UL — SIGNIFICANT CHANGE UP (ref 3.8–10.5)
WBC # FLD AUTO: 6.11 K/UL — SIGNIFICANT CHANGE UP (ref 3.8–10.5)

## 2025-05-11 PROCEDURE — 99232 SBSQ HOSP IP/OBS MODERATE 35: CPT

## 2025-05-11 RX ORDER — POLYETHYLENE GLYCOL-3350 AND ELECTROLYTES 236; 6.74; 5.86; 2.97; 22.74 G/274.31G; G/274.31G; G/274.31G; G/274.31G; G/274.31G
2 POWDER, FOR SOLUTION ORAL ONCE
Refills: 0 | Status: COMPLETED | OUTPATIENT
Start: 2025-05-11 | End: 2025-05-11

## 2025-05-11 RX ADMIN — FENOFIBRATE 145 MILLIGRAM(S): 160 TABLET ORAL at 09:11

## 2025-05-11 RX ADMIN — Medication 1 TABLET(S): at 09:12

## 2025-05-11 RX ADMIN — FOLIC ACID 1 MILLIGRAM(S): 1 TABLET ORAL at 09:12

## 2025-05-11 RX ADMIN — ATORVASTATIN CALCIUM 10 MILLIGRAM(S): 80 TABLET, FILM COATED ORAL at 21:30

## 2025-05-11 RX ADMIN — Medication 100 MILLIGRAM(S): at 10:08

## 2025-05-11 RX ADMIN — POLYETHYLENE GLYCOL-3350 AND ELECTROLYTES 2 LITER(S): 236; 6.74; 5.86; 2.97; 22.74 POWDER, FOR SOLUTION ORAL at 16:07

## 2025-05-11 NOTE — PROGRESS NOTE ADULT - ASSESSMENT
68 y/o M with a PMH of HLD, HTN, diverticulitis s/p colon resection (2002) and spinal stenosis w/ chronic NSAID use presenting to  ED on 5/10/25 for rectal bleeding. He was recently admitted with a diverticular bleed and required 2 units of pRBCs. Colonoscopy from 5/5/25 showed a large amount of red blood and clots in the left colon which was lavaged and cleared.    Pt admitted with:    #Lower GI bleed/ABL anemia  - Hgb upon admission 7.2 (was previously 8.4 on 5/6/25) -> 8.1  - CT abdomen and pelvis with and without IV contrast did not show active GI bleed.   - Colonoscopy from 5/5/25: Large amount of red blood and clots in the left colon; this was lavaged and cleared.   - s/p 1 unit of PRBC    - check cbc this afternoon and in AM.    - Transfuse PRN fo Hgb less than 7   - Clear liquid diet given GIB will not advance diet today   - GI consult apprecaited   stop drinking alcohol ; it might be acting as antiplatelet    #Alcohol use disorder  – Patient reports he drinks approximately 6–7 beers a day  – Patient reports he has never had any history of alcohol withdrawal or DTs in the past  – Notably patient has not been sober in years and admits to drinking daily  CIWA with prn valium  pt advised to stop drinking alcohol ; it might be acting as antiplatelet    #Hypertension  – At home is on lisinopril 30 mg and Norvasc 5 mg - will hold for now given GI bleed bleed    #Hyperlipidemia  – Continue with home atorvastatin 10 mg  – Continue with home fenofibrate 145 mg once daily    #Protein Calorie Malnutrition   - Albumin of 2.4 upon admission   - F/u nutrition consult     #DVT ppx   - SCDs    #Code Status   - FULL CODE

## 2025-05-11 NOTE — PROGRESS NOTE ADULT - ASSESSMENT
Patient is a 69-year-old male with a history of hyperlipidemia, hypertension, diverticulitis status post colon resection in 2022, spinal stenosis with chronic NSAID use with recent admission at [facility] with diverticular bleed requiring 2 units PRBC and subsequent colonoscopy on 5/5/2025 revealing large amount of red blood and clots in the left colon that was lavaged and cleared revealing an end-to-side colorectal anastomosis in the rectum that was patent and healthy with scattered small mouth diverticula in the descending and ascending colon.  All blood was cleared with no active bleeding found in the entire colon along with no polyps, masses, or mucosal defects.  It was thought that his overall clinical picture was most likely consistent with left-sided diverticular bleed with no current active bleeding at that time.  Hemoglobin currently remained stable from 8.1 to 7.7 today with only 1 blood transfusion on admission on 5/10 for initial hemoglobin of 7.2.  Patient with 3 subsequent bloody bowel movements today with hemodynamic stability.    #BRBPR   #Hx of recent BRBPR - likely L sided diverticular bleeding  - Continue clear liquid diet for now/ Given ongoing blood in stool would hold off on advancing diet further  - Will give 2 L Moviprep to help delineate if there is ongoing active bleeding vs passage of old blood  - Given recent colonoscopy with no other findings of bleeding - likely source is diverticular again. Can consider flex sig if ongoing bleeding with the prep  - If patient were to be unstable overnight with ongoing bleeding would get STAT CTA to evaluate for potential embolization  - Continue to monitor hemoglobin and vitals

## 2025-05-12 ENCOUNTER — NON-APPOINTMENT (OUTPATIENT)
Age: 70
End: 2025-05-12

## 2025-05-12 ENCOUNTER — TRANSCRIPTION ENCOUNTER (OUTPATIENT)
Age: 70
End: 2025-05-12

## 2025-05-12 VITALS
TEMPERATURE: 98 F | DIASTOLIC BLOOD PRESSURE: 68 MMHG | HEART RATE: 64 BPM | OXYGEN SATURATION: 98 % | RESPIRATION RATE: 18 BRPM | SYSTOLIC BLOOD PRESSURE: 134 MMHG

## 2025-05-12 LAB
ALBUMIN SERPL ELPH-MCNC: 2.4 G/DL — LOW (ref 3.3–5)
ALP SERPL-CCNC: 68 U/L — SIGNIFICANT CHANGE UP (ref 40–120)
ALT FLD-CCNC: 22 U/L — SIGNIFICANT CHANGE UP (ref 12–78)
ANION GAP SERPL CALC-SCNC: 3 MMOL/L — LOW (ref 5–17)
AST SERPL-CCNC: 21 U/L — SIGNIFICANT CHANGE UP (ref 15–37)
BILIRUB SERPL-MCNC: 0.3 MG/DL — SIGNIFICANT CHANGE UP (ref 0.2–1.2)
BUN SERPL-MCNC: 7 MG/DL — SIGNIFICANT CHANGE UP (ref 7–23)
CALCIUM SERPL-MCNC: 8.3 MG/DL — LOW (ref 8.5–10.1)
CHLORIDE SERPL-SCNC: 118 MMOL/L — HIGH (ref 96–108)
CO2 SERPL-SCNC: 24 MMOL/L — SIGNIFICANT CHANGE UP (ref 22–31)
CREAT SERPL-MCNC: 0.85 MG/DL — SIGNIFICANT CHANGE UP (ref 0.5–1.3)
EGFR: 93 ML/MIN/1.73M2 — SIGNIFICANT CHANGE UP
EGFR: 93 ML/MIN/1.73M2 — SIGNIFICANT CHANGE UP
GLUCOSE SERPL-MCNC: 89 MG/DL — SIGNIFICANT CHANGE UP (ref 70–99)
HCT VFR BLD CALC: 23.8 % — LOW (ref 39–50)
HCT VFR BLD CALC: 25.7 % — LOW (ref 39–50)
HGB BLD-MCNC: 7.6 G/DL — LOW (ref 13–17)
HGB BLD-MCNC: 8.1 G/DL — LOW (ref 13–17)
MCHC RBC-ENTMCNC: 31.3 PG — SIGNIFICANT CHANGE UP (ref 27–34)
MCHC RBC-ENTMCNC: 31.9 G/DL — LOW (ref 32–36)
MCV RBC AUTO: 97.9 FL — SIGNIFICANT CHANGE UP (ref 80–100)
NRBC # BLD AUTO: 0 K/UL — SIGNIFICANT CHANGE UP (ref 0–0)
NRBC # FLD: 0 K/UL — SIGNIFICANT CHANGE UP (ref 0–0)
NRBC BLD AUTO-RTO: 0 /100 WBCS — SIGNIFICANT CHANGE UP (ref 0–0)
PLATELET # BLD AUTO: 246 K/UL — SIGNIFICANT CHANGE UP (ref 150–400)
PMV BLD: 9.9 FL — SIGNIFICANT CHANGE UP (ref 7–13)
POTASSIUM SERPL-MCNC: 4.3 MMOL/L — SIGNIFICANT CHANGE UP (ref 3.5–5.3)
POTASSIUM SERPL-SCNC: 4.3 MMOL/L — SIGNIFICANT CHANGE UP (ref 3.5–5.3)
PROT SERPL-MCNC: 5.1 GM/DL — LOW (ref 6–8.3)
RBC # BLD: 2.43 M/UL — LOW (ref 4.2–5.8)
RBC # FLD: 15.7 % — HIGH (ref 10.3–14.5)
SODIUM SERPL-SCNC: 145 MMOL/L — SIGNIFICANT CHANGE UP (ref 135–145)
WBC # BLD: 5.66 K/UL — SIGNIFICANT CHANGE UP (ref 3.8–10.5)
WBC # FLD AUTO: 5.66 K/UL — SIGNIFICANT CHANGE UP (ref 3.8–10.5)

## 2025-05-12 PROCEDURE — 99239 HOSP IP/OBS DSCHRG MGMT >30: CPT

## 2025-05-12 RX ORDER — FOLIC ACID 1 MG/1
1 TABLET ORAL
Qty: 0 | Refills: 0 | DISCHARGE
Start: 2025-05-12

## 2025-05-12 RX ORDER — FERROUS SULFATE 137(45) MG
325 TABLET, EXTENDED RELEASE ORAL DAILY
Refills: 0 | Status: DISCONTINUED | OUTPATIENT
Start: 2025-05-12 | End: 2025-05-12

## 2025-05-12 RX ORDER — B1/B2/B3/B5/B6/B12/VIT C/FOLIC 500-0.5 MG
1 TABLET ORAL
Qty: 0 | Refills: 0 | DISCHARGE
Start: 2025-05-12

## 2025-05-12 RX ORDER — FERROUS SULFATE 137(45) MG
1 TABLET, EXTENDED RELEASE ORAL
Qty: 0 | Refills: 0 | DISCHARGE
Start: 2025-05-12

## 2025-05-12 RX ADMIN — Medication 1 TABLET(S): at 09:21

## 2025-05-12 RX ADMIN — FOLIC ACID 1 MILLIGRAM(S): 1 TABLET ORAL at 09:18

## 2025-05-12 RX ADMIN — Medication 100 MILLIGRAM(S): at 09:21

## 2025-05-12 RX ADMIN — FENOFIBRATE 145 MILLIGRAM(S): 160 TABLET ORAL at 09:16

## 2025-05-12 RX ADMIN — Medication 325 MILLIGRAM(S): at 13:39

## 2025-05-12 NOTE — PROGRESS NOTE ADULT - ASSESSMENT
68 y/o M with a PMH of HLD, HTN, diverticulitis s/p colon resection (2002) and spinal stenosis w/ chronic NSAID use presenting to  ED on 5/10/25 for rectal bleeding. He was recently admitted with a diverticular bleed and required 2 units of pRBCs. Colonoscopy from 5/5/25 showed a large amount of red blood and clots in the left colon which was lavaged and cleared.    Pt admitted with:    #Lower GI bleed/ABL anemia  - Hgb upon admission 7.2 (was previously 8.4 on 5/6/25) -> 8.1-->7.6  - CT abdomen and pelvis with and without IV contrast did not show active GI bleed.   - Colonoscopy from 5/5/25: Large amount of red blood and clots in the left colon; this was lavaged and cleared.   - s/p 1 unit of PRBC    - Transfuse PRN fo Hgb less than 7   - Clear liquid diet given GIB - advance diet   - GI consult appreciated   stop drinking alcohol ; it might be acting as antiplatelet    #Alcohol use disorder  – Patient reports he drinks approximately 6–7 beers a day  – Patient reports he has never had any history of alcohol withdrawal or DTs in the past  – Notably patient has not been sober in years and admits to drinking daily  - CIWA pt advised to stop drinking alcohol ; it might be acting as antiplatelet    #Hypertension  – At home is on lisinopril 30 mg and Norvasc 5 mg   - monitor BP     #Hyperlipidemia  - atorvastatin/fenofibrate     #Protein Calorie Malnutrition   - Albumin of 2.4 upon admission   - F/u nutrition consult     #DVT ppx   - SCDs    #Code Status   - FULL CODE    Case d/w team on IDR.      68 y/o M with a PMH of HLD, HTN, diverticulitis s/p colon resection (2002) and spinal stenosis w/ chronic NSAID use presenting to  ED on 5/10/25 for rectal bleeding. He was recently admitted with a diverticular bleed and required 2 units of pRBCs. Colonoscopy from 5/5/25 showed a large amount of red blood and clots in the left colon which was lavaged and cleared.    Pt admitted with:    #Lower GI bleed/ABL anemia  - Hgb upon admission 7.2 (was previously 8.4 on 5/6/25) -> 8.1-->7.6- Will check Hgb this PM  - CT abdomen and pelvis with and without IV contrast did not show active GI bleed.   - Colonoscopy from 5/5/25: Large amount of red blood and clots in the left colon; this was lavaged and cleared.   - s/p 1 unit of PRBC    - Transfuse PRN fo Hgb less than 7   - Clear liquid diet given GIB - advance diet   - GI consult appreciated   stop drinking alcohol ; it might be acting as antiplatelet    #Alcohol use disorder  – Patient reports he drinks approximately 6–7 beers a day  – Patient reports he has never had any history of alcohol withdrawal or DTs in the past  – Notably patient has not been sober in years and admits to drinking daily  - CIWA pt advised to stop drinking alcohol ; it might be acting as antiplatelet    #Hypertension  – At home is on lisinopril 30 mg and Norvasc 5 mg   - monitor BP     #Hyperlipidemia  - atorvastatin/fenofibrate     #Protein Calorie Malnutrition   - Albumin of 2.4 upon admission   - F/u nutrition consult     #DVT ppx   - SCDs    #Code Status   - FULL CODE    Case d/w team on IDR.

## 2025-05-12 NOTE — DISCHARGE NOTE PROVIDER - HOSPITAL COURSE
68 y/o M with a PMH of HLD, HTN, diverticulitis s/p colon resection (2002) and spinal stenosis w/ chronic NSAID use presenting to  ED on 5/10/25 for rectal bleeding. He was recently admitted with a diverticular bleed and required 2 units of pRBCs. Colonoscopy from 5/5/25 showed a large amount of red blood and clots in the left colon which was lavaged and cleared.   Upon arrival to the ED, vitals were 106/93 HR 76 RR 18 T 98.1F and SpO2 of 100% on room air.  Labs significant for Hgb of 7.2. CT abdomen and pelvis with and without IV contrast did not show active GI bleed.   GI consulted- jolieley diverticular bleed and now further bleeding.   plan for dc wit instructions to f/u with GI as an outpatient.       PLAN   #Lower GI bleed/ABL anemia  - Hgb upon admission 7.2 (was previously 8.4 on 5/6/25) -> 8.1-->7.6- Will check Hgb this PM  - CT abdomen and pelvis with and without IV contrast did not show active GI bleed.   - Colonoscopy from 5/5/25: Large amount of red blood and clots in the left colon; this was lavaged and cleared.   - s/p 1 unit of PRBC    - Transfuse PRN fo Hgb less than 7   - Clear liquid diet given GIB - advance diet   - GI consult appreciated - plan for outpatient f/u   - alcohol cessation- it might be acting as antiplatelet    #Alcohol use disorder  – Patient reports he drinks approximately 6–7 beers a day  – Patient reports he has never had any history of alcohol withdrawal or DTs in the past  – Notably patient has not been sober in years and admits to drinking daily  - CIWA pt advised to stop drinking alcohol ; it might be acting as antiplatelet  - DC CIWA     #Hypertension  – continue home meds     #Hyperlipidemia  - atorvastatin/fenofibrate     #Protein Calorie Malnutrition     #DVT ppx   - SCDs    #Code Status   - FULL CODE    Case d/w team on IDR.   68 y/o M with a PMH of HLD, HTN, diverticulitis s/p colon resection (2002) and spinal stenosis w/ chronic NSAID use presenting to  ED on 5/10/25 for rectal bleeding. He was recently admitted with a diverticular bleed and required 2 units of pRBCs. Colonoscopy from 5/5/25 showed a large amount of red blood and clots in the left colon which was lavaged and cleared.  Upon arrival to the ED, vitals were 106/93 HR 76 RR 18 T 98.1F and SpO2 of 100% on room air.  Labs significant for Hgb of 7.2. CT abdomen and pelvis with and without IV contrast did not show active GI bleed. GI consulted- likely diverticular bleed and now further bleeding.   plan for dc with instructions to f/u with GI as an outpatient.     PLAN   #Lower GI bleed/ABL anemia  - Hgb upon admission 7.2 (was previously 8.4 on 5/6/25) -> 8.1-->7.6- Will check Hgb this PM  - CT abdomen and pelvis with and without IV contrast did not show active GI bleed.   - Colonoscopy from 5/5/25: Large amount of red blood and clots in the left colon; this was lavaged and cleared.   - s/p 1 unit of PRBC    - Transfuse PRN fo Hgb less than 7   - Clear liquid diet given GIB - advance diet   - GI consult appreciated - plan for outpatient f/u   - alcohol cessation- it might be acting as antiplatelet    #Alcohol use disorder  – Patient reports he drinks approximately 6–7 beers a day  – Patient reports he has never had any history of alcohol withdrawal or DTs in the past  – Notably patient has not been sober in years and admits to drinking daily  - CIWA pt advised to stop drinking alcohol ; it might be acting as antiplatelet  - DC CIWA     #Hypertension  – continue home meds     #Hyperlipidemia  - atorvastatin/fenofibrate     #Protein Calorie Malnutrition     #DVT ppx   - SCDs    #Code Status   - FULL CODE    Case d/w team on IDR.   70 y/o M with a PMH of HLD, HTN, diverticulitis s/p colon resection (2002) and spinal stenosis w/ chronic NSAID use presenting to  ED on 5/10/25 for rectal bleeding. He was recently admitted with a diverticular bleed and required 2 units of pRBCs. Colonoscopy from 5/5/25 showed a large amount of red blood and clots in the left colon which was lavaged and cleared.  Upon arrival to the ED, vitals were 106/93 HR 76 RR 18 T 98.1F and SpO2 of 100% on room air.  Labs significant for Hgb of 7.2. CT abdomen and pelvis with and without IV contrast did not show active GI bleed. GI consulted- likely diverticular bleed and now further bleeding.   plan for dc with instructions to f/u with GI as an outpatient.     PLAN   #Lower GI bleed/ABL anemia  - Hgb upon admission 7.2 (was previously 8.4 on 5/6/25) -> 8.1-->7.6- Will check Hgb this PM-->8.1  - CT abdomen and pelvis with and without IV contrast did not show active GI bleed.   - Colonoscopy from 5/5/25: Large amount of red blood and clots in the left colon; this was lavaged and cleared.   - s/p 1 unit of PRBC    - Transfuse PRN fo Hgb less than 7   - Clear liquid diet given GIB - advance diet   - GI consult appreciated - plan for outpatient f/u   - alcohol cessation- it might be acting as antiplatelet    #Alcohol use disorder  – Patient reports he drinks approximately 6–7 beers a day  – Patient reports he has never had any history of alcohol withdrawal or DTs in the past  – Notably patient has not been sober in years and admits to drinking daily  - CIWA pt advised to stop drinking alcohol ; it might be acting as antiplatelet  - DC CIWA     #Hypertension  – continue home meds     #Hyperlipidemia  - atorvastatin/fenofibrate     #Protein Calorie Malnutrition     #DVT ppx   - SCDs    #Code Status   - FULL CODE    Case d/w team on IDR.

## 2025-05-12 NOTE — SBIRT NOTE ADULT - NSSBIRTALCPOSREINDET_GEN_A_CORE
Full screening completed with patient bedside. Patient endorses consuming roughly 10 drinks/week and 2-4 standard drinks on a typical day when drinking. Screening results discussed with patient. Patient endorses no concerns with use and denies need for additional resources. Healthy use guidelines reviewed with patient.

## 2025-05-12 NOTE — DISCHARGE NOTE PROVIDER - CARE PROVIDERS DIRECT ADDRESSES
[email or web address]

## 2025-05-12 NOTE — DISCHARGE NOTE NURSING/CASE MANAGEMENT/SOCIAL WORK - NSFLUVACAGEDISCH_IMM_ALL_CORE
Adult Patient ID: Mary is a 56 year old female.  MRN: [id number]  Chief Complaint   Patient presents with   • Establish Care   • Shoulder Pain     HISTORY OF PRESENT ILLNESS  Patient presents to establish care.    Health Maintenance  Last Pap Smear: years ago; she is s/p hysterectomy.     Last Mammogram: 2025; wnl.     Last Colon Cancer Screenin2022.    Patient has a known history of diabetes. She states she used to be on metformin but discontinued it due to adverse effects.   Also states she was on Trulicity but it was typically on back order.  This prompted her to change her life.  Significant lifestyle changes led to ~100 lbs weight loss with diet and .  She is not currently taking any medication for her diabetes.     Taking hydrochlorothiazide daily.  She does not regularly check her blood pressure at home.   Denies any headaches or blurred vision.    Patient requests referral to sleep medicine for her sleep apnea.  Her CPAP got recalled and now she is using a mini device that she purchased out-of-pocket.    Today, she complains of right shoulder pain.  It has been present for 2 years, described as popping during chest presses, occasionally locked.   Pain rated 2/10 today.   Relief with hot shower or heating pad.  Denies any known trauma.  Never had any imaging performed.     Supplemental  She is on medicare disability due to severe stenosis. Receives injections with pain management. Further injections depend on gym activities. Was told she may need surgery.     ROS: per HPI. Otherwise negative.    Patient's medications were reviewed and updated as appropriate.    ALLERGIES  ALLERGIES:   Allergen Reactions   • Penicillins SWELLING and Other (See Comments)     Eyes        MEDICAL HISTORY  Past Medical History:   Diagnosis Date   • Allergy    • Anemia    • Arthritis    • Chronic obstructive asthma  (CMD)    • Colon polyp    • Diabetes mellitus  (CMD)    • Essential (primary) hypertension    • Family  history of colon cancer    • Malignant neoplasm  (CMD)    • Morbid obesity  (CMD)    • Neuromuscular disorder  (CMD)    • RAD (reactive airway disease) (CMD)    • Sleep apnea    • Thyroid disease      Patient Active Problem List   Diagnosis   • Benign essential HTN   • Chronic obstructive asthma  (CMD)   • Diabetes mellitus type 2, controlled  (CMD)   • Encounter for general health examination   • Lymphedema of leg   • Lumbar radiculopathy, chronic   • Morbid obesity  (CMD)   • Obstructive sleep apnea   • Nontoxic nodular goiter   • Chronic right shoulder pain   • Reflux esophagitis   • Post-void dribbling   • Refused influenza vaccine   • Abnormal serum level of alkaline phosphatase   • Papillary carcinoma of thyroid  (CMD)   • COVID-19 vaccination refused   • Chronic pain of both knees   • Spinal stenosis of lumbar region with neurogenic claudication   • Medicare annual wellness visit, initial   • Diverticulosis of colon     SURGICAL HISTORY  Past Surgical History:   Procedure Laterality Date   • Colonoscopy  03/07/2017   • Foot surgery Right    • Hernia repair     • Hysterectomy     • Thyroid lobectomy,unilat       FAMILY HISTORY  Family History   Problem Relation Age of Onset   • Cancer, Stomach Mother    • Cancer, Pancreatic Mother    • Cancer Brother         colon   • Cancer, Liver Brother    • Cancer, Colon Brother    • Cancer Maternal Uncle         colon   • Cancer, Colon Maternal Uncle    • Cancer Maternal Grandfather         colon   • Cancer, Colon Maternal Grandfather      SOCIAL HISTORY  Social History     Socioeconomic History   • Marital status:      Spouse name: Not on file   • Number of children: Not on file   • Years of education: Not on file   • Highest education level: Not on file   Occupational History   • Not on file   Tobacco Use   • Smoking status: Never   • Smokeless tobacco: Never   Substance and Sexual Activity   • Alcohol use: No   • Drug use: No   • Sexual activity: Not on file    Other Topics Concern   • Not on file   Social History Narrative   • Not on file     Social Drivers of Health     Financial Resource Strain: Low Risk  (8/3/2024)    Financial Resource Strain    • Unable to Get: None   Food Insecurity: Low Risk  (11/26/2024)    Food Insecurity    • Worried about Food: Never true    • Food is Gone: Never true   Transportation Needs: Not At Risk (11/26/2024)    Transportation Needs    • Lack of Reliable Transportation: No   Physical Activity: Low Risk  (8/3/2024)    Exercise Vital Sign    • Days of Exercise per Week: 3 days    • Minutes of Exercise per Session: 100 min   Stress: Low Risk  (8/3/2024)    Stress    • How Stressed: Not at all   Social Connections: Low Risk  (8/3/2024)    Social Connections    • Social Connectivity: 3 to 5 times a week   Feeling safe in your relationship: Not on file     CURRENT MEDICATIONS  Current Outpatient Medications   Medication Sig Dispense Refill   • acetaminophen-codeine (TYLENOL NO.3) 300-30 MG per tablet TAKE 1 TABLET BY MOUTH EVERY 4 TO 6 HOURS AS NEEDED     • chlorhexidine gluconate (PERIDEX) 0.12 % solution RINSE AND SPIT 15ML BY MOUTH IN THE MORNING AND EVENING. DO NOT EAT OR DRINK FOR 30 MINUTES AFTER.     • clindamycin (CLEOCIN) 300 MG capsule TAKE 1 CAPSULE BY MOUTH FOUR TIMES DAILY UNTIL GONE     • loratadine (CLARITIN) 10 MG tablet Take 1 tablet by mouth daily. 30 tablet 5   • lisinopril (ZESTRIL) 40 MG tablet Take 1 tablet by mouth daily. 90 tablet 3   • hydroCHLOROthiazide 25 MG tablet Take 1 tablet by mouth daily. 90 tablet 3   • folic acid (FOLATE) 1 MG tablet Take 1 tablet by mouth daily. 90 tablet 1   • gabapentin (NEURONTIN) 300 MG capsule Take 1 capsule by mouth in the morning and 1 capsule in the evening. 180 capsule 3   • OneTouch Delica Lancets 33G Misc 3/day 100 each 6   • blood glucose (OneTouch Verio) test strip Test blood sugar 3 times daily. Diagnosis: 100 each 11   • Blood Glucose Monitoring Suppl (OneTouch Verio  Reflect) w/Device Kit 1 Units daily. 1 kit 0   • albuterol 108 (90 Base) MCG/ACT inhaler Inhale 2 puffs into the lungs every 4 hours as needed for Shortness of Breath or Wheezing. 1 each 1   • fluticasone (VERAMYST) 27.5 MCG/SPRAY nasal spray Spray 1 spray in each nostril in the morning and 1 spray in the evening. 10 g 3   • Cholecalciferol (Vitamin D) 125 MCG (5000 UT) Cap Take 10,000 Units by mouth daily.     • Ascorbic Acid (vitamin C) 1000 MG tablet Take 1,000 mg by mouth daily.     • ZINC SULFATE PO Take 1 tablet by mouth daily.     • magnesium 250 MG tablet Take 250 mg by mouth daily.     • VITAMIN B COMPLEX-C Cap Every 5 days       No current facility-administered medications for this visit.     OBJECTIVE  Vitals:    04/28/25 1342   BP: 117/59   Pulse: 69   Resp: 18   Temp: 98.7 °F (37.1 °C)   SpO2: 100%   Weight: 114.4 kg (252 lb 5.1 oz)   Height: 5' 3\" (1.6 m)   PainSc: 2    PainLoc: Shoulder     Physical Exam  Vitals reviewed.   Constitutional:       General: She is not in acute distress.     Appearance: She is well-developed. She is obese. She is not ill-appearing.   HENT:      Head: Normocephalic and atraumatic.      Right Ear: External ear normal.      Left Ear: External ear normal.   Cardiovascular:      Rate and Rhythm: Normal rate and regular rhythm.      Heart sounds: Normal heart sounds.   Pulmonary:      Effort: Pulmonary effort is normal. No respiratory distress.      Breath sounds: Normal breath sounds. No wheezing.   Skin:     General: Skin is warm and dry.        ASSESSMENT AND PLAN  Problem List Items Addressed This Visit        Cardiac and Vasculature    Benign essential HTN    BP is well controlled.  Continue current medications: hydrochlorothiazide 25mg QD.  Recommend home BP monitoring with log, regular exercise as tolerated, stress reduction, low sodium diet, and weight loss.            Endocrine and Metabolic    Diabetes mellitus type 2, controlled  (CMD) - Primary    Monitor: The  problem is well controlled as evidenced by A1c of 4.8.  Evaluation: No labs/tests required today.  Assessment/Treatment:  Continue control with lifestyle.  On ACE/ARB: no (negative microalbumin; consider swapping hydrochlorothiazide for losartan at future appointment).   On Statin: no  Eye exam: to be performed on site; awaiting results  Foot exam: performed today  Microalbumin: UTD (normal 8/2024)  Recommend low carb diet, weight loss, and regular physical activity as tolerated.            Musculoskeletal and Injuries    Chronic right shoulder pain    Avoid exacerbating activities  PT ordered with patient to schedule  Imaging pending  PRN OTC medication for moderate to severe pain         Relevant Orders    XR SHOULDER 3 VIEWS RIGHT    SERVICE TO PHYSICAL THERAPY    SERVICE TO ORTHOPEDICS       Sleep    Obstructive sleep apnea    Referral given to sleep medicine today to re-establish care         Relevant Orders    SERVICE TO SLEEP MEDICINE      Health Maintenance Summary     Diabetes Eye Exam (Yearly)  Overdue since 2/28/2024    Hepatitis B Vaccine (1 of 3 - 19+ 3-dose series)  Never done    Traditional Medicare- Medicare Wellness Visit (Yearly)  Scheduled for 8/8/2025    COVID-19 Vaccine (1)  Postponed until 9/15/2025    Pneumococcal Vaccine 50+ (2 of 2 - PCV)  Postponed until 4/28/2026    Shingles Vaccine (1 of 2)  Postponed until 7/25/2028    Microalbumin Ratio (Yearly)  Next due on 8/5/2025    GFR (Yearly)  Next due on 8/5/2025    Influenza Vaccine (Season Ended)  Next due on 9/1/2025    Diabetes A1C (Every 6 Months)  Next due on 10/9/2025    Depression Screening (Yearly)  Next due on 4/9/2026    Diabetes Foot Exam (Yearly)  Next due on 4/28/2026    Breast Cancer Screening (Every 2 Years)  Next due on 1/13/2027    DTaP/Tdap/Td Vaccine (2 - Td or Tdap)  Next due on 11/8/2027    Colorectal Cancer Screening (Colonoscopy - Preferred)  Next due on 11/14/2032    Hepatitis A Vaccine   Aged Out    Meningococcal  Vaccine   Aged Out    Meningococcal Serogroup B Vaccine   Aged Out    HPV Vaccine   Aged Out        Schedule follow up: Return in about 3 months (around 8/6/2025) for medicare wellness.    YINA RICARDO MD, MPH  Board Certified Family Medicine Physician

## 2025-05-12 NOTE — DISCHARGE NOTE PROVIDER - NSDCFUSCHEDAPPT_GEN_ALL_CORE_FT
[facility]  ORTHOSURG [address] [facility]  Scheduled Appointment: 05/22/2025

## 2025-05-12 NOTE — DISCHARGE NOTE PROVIDER - NSDCCPCAREPLAN_GEN_ALL_CORE_FT
PRINCIPAL DISCHARGE DIAGNOSIS  Diagnosis: Lower GI bleed  Assessment and Plan of Treatment: f/u with gastroenteterologist as an outpatient  your dc hemoglobin is   come back to the ED if your symptoms worsen.   resume all your home meds  acvoid taking aspirin or NSAID's like motrin, ibuprofen, advil        SECONDARY DISCHARGE DIAGNOSES  Diagnosis: ETOH abuse  Assessment and Plan of Treatment: stop drining alcohol- this could be contirubuting to the increased risk of bleeding.     PRINCIPAL DISCHARGE DIAGNOSIS  Diagnosis: Lower GI bleed  Assessment and Plan of Treatment: f/u with gastroenteterologist as an outpatient  your dc hemoglobin is 8.1  come back to the ED if your symptoms worsen.   resume all your home meds  acvoid taking aspirin or NSAID's like motrin, ibuprofen, advil        SECONDARY DISCHARGE DIAGNOSES  Diagnosis: ETOH abuse  Assessment and Plan of Treatment: stop drining alcohol- this could be contirubuting to the increased risk of bleeding.

## 2025-05-12 NOTE — DISCHARGE NOTE PROVIDER - DETAILS OF MALNUTRITION DIAGNOSIS/DIAGNOSES
This patient has been assessed with a concern for Malnutrition and was treated during this hospitalization for the following Nutrition diagnosis/diagnoses:     -  05/10/2025: Severe protein-calorie malnutrition

## 2025-05-12 NOTE — DISCHARGE NOTE PROVIDER - PROVIDER TOKENS
PROVIDER:[TOKEN:[[id number]]],FREE:[LAST:[your PCP in the community],PHONE:[(   )    -],FAX:[(   )    -]]

## 2025-05-12 NOTE — PROGRESS NOTE ADULT - SUBJECTIVE AND OBJECTIVE BOX
Patient is a 69y old  Male who presents with a chief complaint of rectal bleeding (11 May 2025 10:38)    Hemoglobin currently remained stable from 8.1 to 7.7 today with only 1 blood transfusion on admission on 5/10 for initial hemoglobin of 7.2.  Patient with 3 subsequent bloody bowel movements today with hemodynamic stability. Denies abdominal pain.    MEDICATIONS  (STANDING):  atorvastatin 10 milliGRAM(s) Oral at bedtime  fenofibrate Tablet 145 milliGRAM(s) Oral daily  folic acid 1 milliGRAM(s) Oral daily  multivitamin 1 Tablet(s) Oral daily  thiamine 100 milliGRAM(s) Oral daily    MEDICATIONS  (PRN):  diazepam  Injectable 5 milliGRAM(s) IV Push every 1 hour PRN CIWA 8-14  diazepam  Injectable 10 milliGRAM(s) IV Push every 1 hour PRN CIWA 15 or greater, or seizure      Vital Signs Last 24 Hrs  T(C): 36.6 (11 May 2025 12:00), Max: 36.7 (11 May 2025 08:00)  T(F): 97.8 (11 May 2025 12:00), Max: 98.1 (11 May 2025 08:00)  HR: 62 (11 May 2025 12:00) (58 - 74)  BP: 121/60 (11 May 2025 12:00) (117/62 - 137/74)  BP(mean): --  RR: 18 (11 May 2025 12:00) (18 - 19)  SpO2: 100% (11 May 2025 12:00) (98% - 100%)    Parameters below as of 11 May 2025 12:00  Patient On (Oxygen Delivery Method): room air        PHYSICAL EXAM:    Constitutional: No acute distress, well-developed, non-toxic appearing  HEENT: masked, good phonation, not icteric  Neck: supple, no lymphadenopathy  Respiratory: clear to ascultation bilaterally, no wheezing  Cardiovascular: S1 and S2, regular rate and rhythm   Gastrointestinal: soft, non-tender, non-distended, +bowel sounds, no rebound or guarding   Neurological: A/O x 3  Psychiatric: Normal mood, normal affect  Skin: No rashes    LABS:                        7.7    x     )-----------( x        ( 11 May 2025 12:27 )             23.8     05-11    142  |  115[H]  |  8   ----------------------------<  91  4.0   |  23  |  0.78    Ca    8.2[L]      11 May 2025 07:48  Phos  3.2     05-11  Mg     2.0     05-11    TPro  5.0[L]  /  Alb  2.4[L]  /  TBili  0.4  /  DBili  0.1  /  AST  24  /  ALT  24  /  AlkPhos  67  05-10    PT/INR - ( 10 May 2025 00:58 )   PT: 11.1 sec;   INR: 0.96 ratio         PTT - ( 10 May 2025 00:58 )  PTT:28.6 sec  LIVER FUNCTIONS - ( 10 May 2025 12:04 )  Alb: 2.4 g/dL / Pro: 5.0 gm/dL / ALK PHOS: 67 U/L / ALT: 24 U/L / AST: 24 U/L / GGT: x             RADIOLOGY & ADDITIONAL STUDIES:
5/10: no new GI bleed today  Hb stable  no abd pain      PHYSICAL EXAM:    Daily Height in cm: 182.88 (10 May 2025 00:18)    Daily     Vital Signs Last 24 Hrs  T(C): 36.4 (10 May 2025 12:16), Max: 37 (10 May 2025 03:15)  T(F): 97.5 (10 May 2025 12:16), Max: 98.6 (10 May 2025 03:15)  HR: 63 (10 May 2025 12:16) (63 - 78)  BP: 114/83 (10 May 2025 12:16) (106/93 - 139/69)  BP(mean): 90 (10 May 2025 07:29) (78 - 90)  RR: 17 (10 May 2025 12:16) (14 - 18)  SpO2: 99% (10 May 2025 12:16) (96% - 100%)    Constitutional: Weak and ill appearing  HEENT: Atraumatic, NOAH,   Respiratory: Breath Sounds normal, no rhonchi/wheeze  Cardiovascular: N S1S2;   Gastrointestinal: Abdomen soft, non tender, Bowel Sounds present  Extremities: No edema, peripheral pulses present  Neurological: AAO x 3, no gross focal motor deficits  Skin: Non cellulitic, no rash, ulcers  Lymph Nodes: No lymphadenopathy noted  Back: No CVA tenderness   Musculoskeletal: non tender  Breasts: Deferred  Genitourinary: deferred  Rectal: Deferred    All Labs/EKG/Radiology/Meds reviewed by me                          7.4    x     )-----------( x        ( 10 May 2025 12:04 )             22.6       CBC Full  -  ( 10 May 2025 12:04 )  WBC Count : x  RBC Count : x  Hemoglobin : 7.4 g/dL  Hematocrit : 22.6 %  Platelet Count - Automated : x  Mean Cell Volume : x  Mean Cell Hemoglobin : x  Mean Cell Hemoglobin Concentration : x  Auto Neutrophil # : x  Auto Lymphocyte # : x  Auto Monocyte # : x  Auto Eosinophil # : x  Auto Basophil # : x  Auto Neutrophil % : x  Auto Lymphocyte % : x  Auto Monocyte % : x  Auto Eosinophil % : x  Auto Basophil % : x      05-10    142  |  112[H]  |  12  ----------------------------<  92  4.0   |  22  |  0.78    Ca    8.1[L]      10 May 2025 07:51  Phos  3.6     05-10  Mg     2.0     05-10    TPro  5.0[L]  /  Alb  2.4[L]  /  TBili  0.2  /  DBili  x   /  AST  25  /  ALT  27  /  AlkPhos  70  05-10      LIVER FUNCTIONS - ( 10 May 2025 00:58 )  Alb: 2.4 g/dL / Pro: 5.0 gm/dL / ALK PHOS: 70 U/L / ALT: 27 U/L / AST: 25 U/L / GGT: x             PT/INR - ( 10 May 2025 00:58 )   PT: 11.1 sec;   INR: 0.96 ratio         PTT - ( 10 May 2025 00:58 )  PTT:28.6 sec          Urinalysis Basic - ( 10 May 2025 07:51 )    Color: x / Appearance: x / SG: x / pH: x  Gluc: 92 mg/dL / Ketone: x  / Bili: x / Urobili: x   Blood: x / Protein: x / Nitrite: x   Leuk Esterase: x / RBC: x / WBC x   Sq Epi: x / Non Sq Epi: x / Bacteria: x      < from: CT Abdomen and Pelvis w/wo IV Cont (05.10.25 @ 01:41) >  IMPRESSION:    No active GI bleed    < end of copied text >        MEDICATIONS  (STANDING):  atorvastatin 10 milliGRAM(s) Oral at bedtime  fenofibrate Tablet 145 milliGRAM(s) Oral daily  folic acid 1 milliGRAM(s) Oral daily  multivitamin 1 Tablet(s) Oral daily  thiamine 100 milliGRAM(s) Oral daily    MEDICATIONS  (PRN):  diazepam  Injectable 5 milliGRAM(s) IV Push every 1 hour PRN CIWA 8-14  diazepam  Injectable 10 milliGRAM(s) IV Push every 1 hour PRN CIWA 15 or greater, or seizure    
  68 y/o M with a PMH of HLD, HTN, diverticulitis s/p colon resection (2002) and spinal stenosis w/ chronic NSAID use presenting to  ED on 5/10/25 for rectal bleeding. He was recently admitted with a diverticular bleed and required 2 units of pRBCs. Colonoscopy from 5/5/25 showed a large amount of red blood and clots in the left colon which was lavaged and cleared.   Upon arrival to the ED, vitals were 106/93 HR 76 RR 18 T 98.1F and SpO2 of 100% on room air.  Labs significant for Hgb of 7.2. CT abdomen and pelvis with and without IV contrast did not show active GI bleed.       5/12- patient was seen and examined. VSS. Hgb 7.6 this am. no further bloody BM. denies CP or SOB    REVIEW OF SYSTEMS:    CONSTITUTIONAL: No weakness, fevers or chills  EYES/ENT: No visual changes;  No vertigo or throat pain   NECK: No pain or stiffness  RESPIRATORY: No cough, wheezing, hemoptysis; No shortness of breath  CARDIOVASCULAR: No chest pain or palpitations  GASTROINTESTINAL: No abdominal or epigastric pain. No nausea, vomiting, or hematemesis; No diarrhea or constipation. No melena or hematochezia.  GENITOURINARY: No dysuria, frequency or hematuria  NEUROLOGICAL: No numbness or weakness  SKIN: No itching, rashes     Vital Signs Last 24 Hrs  T(C): 36.3 (12 May 2025 08:00), Max: 36.6 (11 May 2025 12:00)  T(F): 97.4 (12 May 2025 08:00), Max: 97.9 (12 May 2025 00:00)  HR: 61 (12 May 2025 08:00) (61 - 71)  BP: 137/72 (12 May 2025 08:00) (121/60 - 143/61)  BP(mean): 82 (11 May 2025 15:50) (82 - 82)  RR: 18 (12 May 2025 08:00) (18 - 18)  SpO2: 100% (12 May 2025 08:00) (98% - 100%)    Parameters below as of 12 May 2025 08:00  Patient On (Oxygen Delivery Method): room air    PE:  Constitutional: NAD, laying in bed  HEENT: NC/AT  Back: no tenderness  Respiratory: respirations even and non labored, LCTA  Cardiovascular: S1S2 regular, no murmurs  Abdomen: soft, not tender, not distended, positive BS  Genitourinary: voiding  Musculoskeletal: no muscle tenderness, no joint swelling or tenderness  Extremities: no pedal edema   Neurological: no focal deficits     MEDICATIONS  (STANDING):  atorvastatin 10 milliGRAM(s) Oral at bedtime  fenofibrate Tablet 145 milliGRAM(s) Oral daily  folic acid 1 milliGRAM(s) Oral daily  multivitamin 1 Tablet(s) Oral daily  thiamine 100 milliGRAM(s) Oral daily    MEDICATIONS  (PRN):  diazepam  Injectable 5 milliGRAM(s) IV Push every 1 hour PRN CIWA 8-14  diazepam  Injectable 10 milliGRAM(s) IV Push every 1 hour PRN CIWA 15 or greater, or seizure      05-12    145  |  118[H]  |  7   ----------------------------<  89  4.3   |  24  |  0.85    Ca    8.3[L]      12 May 2025 04:41  Phos  3.2     05-11  Mg     2.0     05-11    TPro  5.1[L]  /  Alb  2.4[L]  /  TBili  0.3  /  DBili  x   /  AST  21  /  ALT  22  /  AlkPhos  68  05-12                                7.6    5.66  )-----------( 246      ( 12 May 2025 04:41 )             23.8       Hemoglobin: 7.6 g/dL (05.12.25 @ 04:41)   Hemoglobin: 8.0 g/dL (05.11.25 @ 21:45)   Hemoglobin: 7.7 g/dL (05.11.25 @ 12:27)   Hemoglobin: 8.1 g/dL (05.11.25 @ 07:48)   Hemoglobin: 7.1 g/dL (05.10.25 @ 21:03)   Hemoglobin: 7.4 g/dL (05.10.25 @ 13:47)   Hemoglobin: 7.4 g/dL (05.10.25 @ 12:04)       CT Abdomen and Pelvis w/wo IV Cont (05.10.25 @ 01:41) >  IMPRESSION:  No active GI bleed            
5/10: no new GI bleed today  Hb stable  no abd pain  5/11 - pt with 2 bloody BM today.  no tremor no hallucinations.  no cp, sob       PHYSICAL EXAM:    Vital Signs Last 24 Hrs  T(C): 36.7 (11 May 2025 08:00), Max: 36.8 (10 May 2025 14:15)  T(F): 98.1 (11 May 2025 08:00), Max: 98.2 (10 May 2025 14:15)  HR: 70 (11 May 2025 08:00) (58 - 74)  BP: 129/75 (11 May 2025 08:00) (114/83 - 137/74)  BP(mean): --  RR: 18 (11 May 2025 08:00) (17 - 19)  SpO2: 99% (11 May 2025 08:00) (98% - 100%)    Parameters below as of 11 May 2025 08:00  Patient On (Oxygen Delivery Method): room air    GEN: lying in bed, NAD  HEENT:   NC/AT, pupils equal and reactive, EOMI  CV:  +S1, +S2, RRR  RESP:   lungs clear to auscultation bilaterally, no wheeze, rales, rhonchi   BREAST:  not examined  GI:  abdomen soft, non-tender, non-distended, normoactive BS  RECTAL:  not examined  :  not examined  MSK:   normal muscle tone  EXT:  no edema  NEURO:  AAOX3, no focal neurological deficits  SKIN:  no rashes      All Labs/EKG/Radiology/Meds reviewed by me                              8.1    6.11  )-----------( 234      ( 11 May 2025 07:48 )             24.8     05-11    142  |  115[H]  |  8   ----------------------------<  91  4.0   |  23  |  0.78    Ca    8.2[L]      11 May 2025 07:48  Phos  3.2     05-11  Mg     2.0     05-11    TPro  5.0[L]  /  Alb  2.4[L]  /  TBili  0.4  /  DBili  0.1  /  AST  24  /  ALT  24  /  AlkPhos  67  05-10        LIVER FUNCTIONS - ( 10 May 2025 12:04 )  Alb: 2.4 g/dL / Pro: 5.0 gm/dL / ALK PHOS: 67 U/L / ALT: 24 U/L / AST: 24 U/L / GGT: x           PT/INR - ( 10 May 2025 00:58 )   PT: 11.1 sec;   INR: 0.96 ratio         PTT - ( 10 May 2025 00:58 )  PTT:28.6 sec  Urinalysis Basic - ( 11 May 2025 07:48 )    Color: x / Appearance: x / SG: x / pH: x  Gluc: 91 mg/dL / Ketone: x  / Bili: x / Urobili: x   Blood: x / Protein: x / Nitrite: x   Leuk Esterase: x / RBC: x / WBC x   Sq Epi: x / Non Sq Epi: x / Bacteria: x              < from: CT Abdomen and Pelvis w/wo IV Cont (05.10.25 @ 01:41) >  IMPRESSION:    No active GI bleed    < end of copied text >        MEDICATIONS  (STANDING):  atorvastatin 10 milliGRAM(s) Oral at bedtime  fenofibrate Tablet 145 milliGRAM(s) Oral daily  folic acid 1 milliGRAM(s) Oral daily  multivitamin 1 Tablet(s) Oral daily  thiamine 100 milliGRAM(s) Oral daily    MEDICATIONS  (PRN):  diazepam  Injectable 5 milliGRAM(s) IV Push every 1 hour PRN CIWA 8-14  diazepam  Injectable 10 milliGRAM(s) IV Push every 1 hour PRN CIWA 15 or greater, or seizure

## 2025-05-12 NOTE — DISCHARGE NOTE NURSING/CASE MANAGEMENT/SOCIAL WORK - MODE OF TRANSPORTATION
Wheelchair/Stroller Lot # (Optional): [id number] Detail Level: Detailed Ndc# (Optional): [id number] Include Z78.9 (Other Specified Conditions Influencing Health Status) As An Associated Diagnosis?: No X Size Of Lesion In Cm (Optional): 0 Expiration Date (Optional): 12/2025 Consent: The risks of atrophy were reviewed with the patient. Kenalog Preparation: Kenalog Medical Necessity Clause: This procedure was medically necessary because the lesions that were treated were: Concentration (Mg/Ml): 2.5 Total Volume (Ccs- Only Use Numbers And Decimals): .2

## 2025-05-12 NOTE — DISCHARGE NOTE NURSING/CASE MANAGEMENT/SOCIAL WORK - FINANCIAL ASSISTANCE
[facility] provides services at a reduced cost to those who are determined to be eligible through [facility]’s financial assistance program. Information regarding [facility]’s financial assistance program can be found by going to [email or web address] or by calling [phone].

## 2025-05-12 NOTE — DISCHARGE NOTE PROVIDER - NSDCCAREPROVSEEN_GEN_ALL_CORE_FT
Barrett, Dickson Magallon, Cricket Dawson, Helen Suraez, Anne Aguilar, Drew Rosen, Emely Ronquillo, Mar John, Lucy

## 2025-05-12 NOTE — DISCHARGE NOTE PROVIDER - NSDCMRMEDTOKEN_GEN_ALL_CORE_FT
amLODIPine 5 mg oral tablet: 1 tab(s) orally once a day  ascorbic acid 500 mg oral tablet: 2 tab(s) orally once a day  atorvastatin 10 mg oral tablet: 1 tab(s) orally once a day  ergocalciferol 1.25 mg (50,000 intl units) oral capsule: 1 cap(s) orally once a week  fenofibrate 145 mg oral tablet: 1 tab(s) orally once a day  ferrous sulfate 325 mg (65 mg elemental iron) oral tablet: 1 tab(s) orally once a day  folic acid 1 mg oral tablet: 1 tab(s) orally once a day  lisinopril 30 mg oral tablet: 1 tab(s) orally once a day  Multiple Vitamins oral tablet: 1 tab(s) orally once a day  thiamine 100 mg oral tablet: 1 tab(s) orally once a day

## 2025-05-12 NOTE — PROGRESS NOTE ADULT - NUTRITIONAL ASSESSMENT
This patient has been assessed with a concern for Malnutrition and has been determined to have a diagnosis/diagnoses of Severe protein-calorie malnutrition.    This patient is being managed with:   Diet Clear Liquid-  Entered: May 10 2025  3:15AM  

## 2025-05-12 NOTE — DISCHARGE NOTE NURSING/CASE MANAGEMENT/SOCIAL WORK - NSDCPEFALRISK_GEN_ALL_CORE
For information on Fall & Injury Prevention, visit: [email or web address] OR  [email or web address] OR  https://www.cdc.gov/steadi/patient.html

## 2025-05-12 NOTE — DISCHARGE NOTE NURSING/CASE MANAGEMENT/SOCIAL WORK - PATIENT PORTAL LINK FT
You can access the FollowMyHealth Patient Portal offered by [facility] by registering at the following website: [email or web address]. By joining [facility]’s FollowMyHealth portal, you will also be able to view your health information using other applications (apps) compatible with our system.

## 2025-05-12 NOTE — DISCHARGE NOTE PROVIDER - CARE PROVIDER_API CALL
Mono Perez.  Gastroenterology  [address] [place] [address]  Phone: [phone]  Fax: [phone]  Follow Up Time:     your PCP in the community,   Phone: (   )    -  Fax: (   )    -  Follow Up Time:

## 2025-05-15 ENCOUNTER — NON-APPOINTMENT (OUTPATIENT)
Age: 70
End: 2025-05-15

## 2025-05-15 DIAGNOSIS — E78.00 PURE HYPERCHOLESTEROLEMIA, UNSPECIFIED: ICD-10-CM

## 2025-05-15 DIAGNOSIS — M17.10 UNILATERAL PRIMARY OSTEOARTHRITIS, UNSPECIFIED KNEE: ICD-10-CM

## 2025-05-15 DIAGNOSIS — E54 ASCORBIC ACID DEFICIENCY: ICD-10-CM

## 2025-05-15 DIAGNOSIS — E55.9 VITAMIN D DEFICIENCY, UNSPECIFIED: ICD-10-CM

## 2025-05-15 DIAGNOSIS — F10.90 ALCOHOL USE, UNSPECIFIED, UNCOMPLICATED: ICD-10-CM

## 2025-05-15 DIAGNOSIS — I10 ESSENTIAL (PRIMARY) HYPERTENSION: ICD-10-CM

## 2025-05-15 DIAGNOSIS — K57.31 DIVERTICULOSIS OF LARGE INTESTINE WITHOUT PERFORATION OR ABSCESS WITH BLEEDING: ICD-10-CM

## 2025-05-15 DIAGNOSIS — D62 ACUTE POSTHEMORRHAGIC ANEMIA: ICD-10-CM

## 2025-05-19 ENCOUNTER — EMERGENCY (EMERGENCY)
Facility: HOSPITAL | Age: 70
LOS: 0 days | Discharge: HOME | End: 2025-05-20
Attending: FAMILY MEDICINE
Payer: MEDICARE

## 2025-05-19 VITALS
HEIGHT: 72 IN | DIASTOLIC BLOOD PRESSURE: 68 MMHG | RESPIRATION RATE: 18 BRPM | OXYGEN SATURATION: 100 % | TEMPERATURE: 98 F | SYSTOLIC BLOOD PRESSURE: 154 MMHG | HEART RATE: 59 BPM

## 2025-05-19 DIAGNOSIS — R42 DIZZINESS AND GIDDINESS: ICD-10-CM

## 2025-05-19 DIAGNOSIS — R53.1 WEAKNESS: ICD-10-CM

## 2025-05-19 DIAGNOSIS — I49.1 ATRIAL PREMATURE DEPOLARIZATION: ICD-10-CM

## 2025-05-19 DIAGNOSIS — I45.10 UNSPECIFIED RIGHT BUNDLE-BRANCH BLOCK: ICD-10-CM

## 2025-05-19 DIAGNOSIS — I10 ESSENTIAL (PRIMARY) HYPERTENSION: ICD-10-CM

## 2025-05-19 LAB
ALBUMIN SERPL ELPH-MCNC: 3.1 G/DL — LOW (ref 3.3–5)
ALP SERPL-CCNC: 76 U/L — SIGNIFICANT CHANGE UP (ref 40–120)
ALT FLD-CCNC: 29 U/L — SIGNIFICANT CHANGE UP (ref 12–78)
ANION GAP SERPL CALC-SCNC: 5 MMOL/L — SIGNIFICANT CHANGE UP (ref 5–17)
APTT BLD: 27 SEC — SIGNIFICANT CHANGE UP (ref 26.1–36.8)
AST SERPL-CCNC: 25 U/L — SIGNIFICANT CHANGE UP (ref 15–37)
BASOPHILS # BLD AUTO: 0.05 K/UL — SIGNIFICANT CHANGE UP (ref 0–0.2)
BASOPHILS NFR BLD AUTO: 0.9 % — SIGNIFICANT CHANGE UP (ref 0–2)
BILIRUB SERPL-MCNC: 0.1 MG/DL — LOW (ref 0.2–1.2)
BLD GP AB SCN SERPL QL: SIGNIFICANT CHANGE UP
BUN SERPL-MCNC: 11 MG/DL — SIGNIFICANT CHANGE UP (ref 7–23)
CALCIUM SERPL-MCNC: 8.7 MG/DL — SIGNIFICANT CHANGE UP (ref 8.5–10.1)
CHLORIDE SERPL-SCNC: 113 MMOL/L — HIGH (ref 96–108)
CO2 SERPL-SCNC: 24 MMOL/L — SIGNIFICANT CHANGE UP (ref 22–31)
CREAT SERPL-MCNC: 0.94 MG/DL — SIGNIFICANT CHANGE UP (ref 0.5–1.3)
EGFR: 87 ML/MIN/1.73M2 — SIGNIFICANT CHANGE UP
EGFR: 87 ML/MIN/1.73M2 — SIGNIFICANT CHANGE UP
EOSINOPHIL # BLD AUTO: 0.33 K/UL — SIGNIFICANT CHANGE UP (ref 0–0.5)
EOSINOPHIL NFR BLD AUTO: 6.1 % — HIGH (ref 0–6)
GLUCOSE SERPL-MCNC: 81 MG/DL — SIGNIFICANT CHANGE UP (ref 70–99)
HCT VFR BLD CALC: 28.2 % — LOW (ref 39–50)
HGB BLD-MCNC: 8.7 G/DL — LOW (ref 13–17)
IMM GRANULOCYTES # BLD AUTO: 0.03 K/UL — SIGNIFICANT CHANGE UP (ref 0–0.07)
IMM GRANULOCYTES NFR BLD AUTO: 0.6 % — SIGNIFICANT CHANGE UP (ref 0–0.9)
INR BLD: 0.99 RATIO — SIGNIFICANT CHANGE UP (ref 0.85–1.16)
LYMPHOCYTES # BLD AUTO: 1.26 K/UL — SIGNIFICANT CHANGE UP (ref 1–3.3)
LYMPHOCYTES NFR BLD AUTO: 23.2 % — SIGNIFICANT CHANGE UP (ref 13–44)
MCHC RBC-ENTMCNC: 30.9 G/DL — LOW (ref 32–36)
MCHC RBC-ENTMCNC: 30.9 PG — SIGNIFICANT CHANGE UP (ref 27–34)
MCV RBC AUTO: 100 FL — SIGNIFICANT CHANGE UP (ref 80–100)
MONOCYTES # BLD AUTO: 0.71 K/UL — SIGNIFICANT CHANGE UP (ref 0–0.9)
MONOCYTES NFR BLD AUTO: 13.1 % — SIGNIFICANT CHANGE UP (ref 2–14)
NEUTROPHILS # BLD AUTO: 3.05 K/UL — SIGNIFICANT CHANGE UP (ref 1.8–7.4)
NEUTROPHILS NFR BLD AUTO: 56.1 % — SIGNIFICANT CHANGE UP (ref 43–77)
NRBC # BLD AUTO: 0 K/UL — SIGNIFICANT CHANGE UP (ref 0–0)
NRBC # FLD: 0 K/UL — SIGNIFICANT CHANGE UP (ref 0–0)
NRBC BLD AUTO-RTO: 0 /100 WBCS — SIGNIFICANT CHANGE UP (ref 0–0)
PLATELET # BLD AUTO: 441 K/UL — HIGH (ref 150–400)
PMV BLD: 10.4 FL — SIGNIFICANT CHANGE UP (ref 7–13)
POTASSIUM SERPL-MCNC: 4.1 MMOL/L — SIGNIFICANT CHANGE UP (ref 3.5–5.3)
POTASSIUM SERPL-SCNC: 4.1 MMOL/L — SIGNIFICANT CHANGE UP (ref 3.5–5.3)
PROT SERPL-MCNC: 6.1 GM/DL — SIGNIFICANT CHANGE UP (ref 6–8.3)
PROTHROM AB SERPL-ACNC: 11.4 SEC — SIGNIFICANT CHANGE UP (ref 9.9–13.4)
RBC # BLD: 2.82 M/UL — LOW (ref 4.2–5.8)
RBC # FLD: 15.4 % — HIGH (ref 10.3–14.5)
SODIUM SERPL-SCNC: 142 MMOL/L — SIGNIFICANT CHANGE UP (ref 135–145)
TROPONIN I, HIGH SENSITIVITY RESULT: 8.2 NG/L — SIGNIFICANT CHANGE UP
WBC # BLD: 5.43 K/UL — SIGNIFICANT CHANGE UP (ref 3.8–10.5)
WBC # FLD AUTO: 5.43 K/UL — SIGNIFICANT CHANGE UP (ref 3.8–10.5)

## 2025-05-19 PROCEDURE — 93005 ELECTROCARDIOGRAM TRACING: CPT

## 2025-05-19 PROCEDURE — 86900 BLOOD TYPING SEROLOGIC ABO: CPT

## 2025-05-19 PROCEDURE — 83735 ASSAY OF MAGNESIUM: CPT

## 2025-05-19 PROCEDURE — 80053 COMPREHEN METABOLIC PANEL: CPT

## 2025-05-19 PROCEDURE — 86850 RBC ANTIBODY SCREEN: CPT

## 2025-05-19 PROCEDURE — 81003 URINALYSIS AUTO W/O SCOPE: CPT

## 2025-05-19 PROCEDURE — 71045 X-RAY EXAM CHEST 1 VIEW: CPT

## 2025-05-19 PROCEDURE — 36415 COLL VENOUS BLD VENIPUNCTURE: CPT

## 2025-05-19 PROCEDURE — 99285 EMERGENCY DEPT VISIT HI MDM: CPT | Mod: 25

## 2025-05-19 PROCEDURE — 85018 HEMOGLOBIN: CPT

## 2025-05-19 PROCEDURE — 84484 ASSAY OF TROPONIN QUANT: CPT

## 2025-05-19 PROCEDURE — 71045 X-RAY EXAM CHEST 1 VIEW: CPT | Mod: 26

## 2025-05-19 PROCEDURE — 85014 HEMATOCRIT: CPT

## 2025-05-19 PROCEDURE — 0241U: CPT

## 2025-05-19 PROCEDURE — 85730 THROMBOPLASTIN TIME PARTIAL: CPT

## 2025-05-19 PROCEDURE — 99285 EMERGENCY DEPT VISIT HI MDM: CPT | Mod: FS

## 2025-05-19 PROCEDURE — 86901 BLOOD TYPING SEROLOGIC RH(D): CPT

## 2025-05-19 PROCEDURE — 85025 COMPLETE CBC W/AUTO DIFF WBC: CPT

## 2025-05-19 PROCEDURE — 85610 PROTHROMBIN TIME: CPT

## 2025-05-19 PROCEDURE — 93010 ELECTROCARDIOGRAM REPORT: CPT

## 2025-05-19 PROCEDURE — 87086 URINE CULTURE/COLONY COUNT: CPT

## 2025-05-19 NOTE — ED PROVIDER NOTE - PATIENT PORTAL LINK FT
You can access the FollowMyHealth Patient Portal offered by [facility] by registering at the following website: [email or web address]. By joining [employer]’s FollowMyHealth portal, you will also be able to view your health information using other applications (apps) compatible with our system.

## 2025-05-19 NOTE — ED ADULT TRIAGE NOTE - CHIEF COMPLAINT QUOTE
Pt. ambulatory into ED c/o low HR, low BP at home. Pt. dc'ed from  on 5/12 after rectal bleed r/t diverticulitis. Denies any recent rectal bleeding. Pt. states, "I just haven't felt the same since", endorsing generalized lethargy. HR 59, /68 (92) in triage. Pt. denies pain. Sent for EKG. Pt. ambulatory into ED c/o low HR, low BP at home. Pt. dc'ed from  on 5/12 after hospitalization for rectal bleeding r/t diverticulitis. Denies any rectal bleeding today. Pt. states, "I just haven't felt the same since", endorsing generalized lethargy. During hospitalization, pt. received multiple blood transfusions. HR 59, /68 (92) in triage. Sent for EKG.

## 2025-05-19 NOTE — ED PROVIDER NOTE - OBJECTIVE STATEMENT
69-year-old male with a history of hypertension recently admitted twice in 2 weeks for diverticular bleed received transfusions while he was here who complained of feeling a little woozy today weak no chest pain or shortness of breath wife later on during the day to the patient's blood pressure is 116 without the patient having taken his blood pressure medication which was added for the patient.  Then wife took his pulse and a pulse oximeter and the heart rate kept fluctuating between 80 and 30.  No loss of consciousness no arm or leg weakness no difficulty speaking.  No fever no shortness of breath.  Patient says he just does not feel right.  No black stools.

## 2025-05-19 NOTE — ED ADULT NURSE NOTE - NSFALLRISKINTERV_ED_ALL_ED

## 2025-05-19 NOTE — ED PROVIDER NOTE - PROGRESS NOTE DETAILS
patient reporting general feeling of unwell, was hospitalized the last two weekends with diverticular bleeds.  Of note wife reports she was checking his pulse ox and his heart rate was dropping low into 40s and then increased back up,  He denies CP, SOB, dizziness, fever, chills, N/V.  very well appearing, HR in 60s.  Will screen for infectious etiology, cardiac etiology and monitor -Britney Cerrato PA-C Patient is feeling better at this time.  I explained the results to the patient.  The patient's H&H is stable.  The patient is able to stand and walk without assistance.  Patient does not feel dizzy and walking.  Patient feels comfortable with being discharged.  Chevy Stock, DO

## 2025-05-19 NOTE — ED ADULT NURSE NOTE - CHIEF COMPLAINT QUOTE
Pt. ambulatory into ED c/o low HR, low BP at home. Pt. dc'ed from  on 5/12 after hospitalization for rectal bleeding r/t diverticulitis. Denies any rectal bleeding today. Pt. states, "I just haven't felt the same since", endorsing generalized lethargy. During hospitalization, pt. received multiple blood transfusions. HR 59, /68 (92) in triage. Sent for EKG.

## 2025-05-19 NOTE — ED PROVIDER NOTE - NSICDXPASTMEDICALHX_GEN_ALL_CORE_FT
PAST MEDICAL HISTORY:  High cholesterol     History of GI diverticular bleed     Hypertension, unspecified type

## 2025-05-19 NOTE — ED ADULT NURSE NOTE - OBJECTIVE STATEMENT
Patient is a 69y old male, alert and oriented X3, presenting to the ER with see chief complaint quote. As per the patients wife, "He was here in  twice this month with rectal bleeding. 2 Fridays ago was his last admission where he received blood transfusions. He was discharged home last Monday. Today he was not acting himself, he was very laid back. I was checking his heart rate that was fluctuating between 60's down to the 30's. Then I noticed that his pressure was on the lower side for him. He did not take his blood pressure medication today."  Patient denies CP, SOB, fevers, nausea, vomiting.   Patient endorses lightheadedness.   EKG completed and cardiac monitor in place.

## 2025-05-19 NOTE — ED PROVIDER NOTE - CLINICAL SUMMARY MEDICAL DECISION MAKING FREE TEXT BOX
69-year-old male with a history of hypertension recently admitted twice in 2 weeks for diverticular bleed received transfusions while he was here who complained of feeling a little woozy today weak no chest pain or shortness of breath wife later on during the day to the patient's blood pressure is 116 without the patient having taken his blood pressure medication which was added for the patient.  Then wife took his pulse and a pulse oximeter and the heart rate kept fluctuating between 80 and 30.  No loss of consciousness no arm or leg weakness no difficulty speaking.  No fever no shortness of breath.  Patient says he just does not feel right.  No black stools.Labs, ekg,ivf reeval

## 2025-05-20 VITALS
DIASTOLIC BLOOD PRESSURE: 56 MMHG | HEART RATE: 58 BPM | TEMPERATURE: 98 F | OXYGEN SATURATION: 100 % | SYSTOLIC BLOOD PRESSURE: 125 MMHG | RESPIRATION RATE: 17 BRPM

## 2025-05-20 LAB
ADD ON TEST-SPECIMEN IN LAB: SIGNIFICANT CHANGE UP
APPEARANCE UR: CLEAR — SIGNIFICANT CHANGE UP
BILIRUB UR-MCNC: NEGATIVE — SIGNIFICANT CHANGE UP
COLOR SPEC: YELLOW — SIGNIFICANT CHANGE UP
DIFF PNL FLD: NEGATIVE — SIGNIFICANT CHANGE UP
FLUAV AG NPH QL: SIGNIFICANT CHANGE UP
FLUBV AG NPH QL: SIGNIFICANT CHANGE UP
GLUCOSE UR QL: NEGATIVE MG/DL — SIGNIFICANT CHANGE UP
HCT VFR BLD CALC: 28.4 % — LOW (ref 39–50)
HGB BLD-MCNC: 8.7 G/DL — LOW (ref 13–17)
KETONES UR QL: NEGATIVE MG/DL — SIGNIFICANT CHANGE UP
LEUKOCYTE ESTERASE UR-ACNC: NEGATIVE — SIGNIFICANT CHANGE UP
MAGNESIUM SERPL-MCNC: 2 MG/DL — SIGNIFICANT CHANGE UP (ref 1.6–2.6)
NITRITE UR-MCNC: NEGATIVE — SIGNIFICANT CHANGE UP
PH UR: 6 — SIGNIFICANT CHANGE UP (ref 5–8)
PROT UR-MCNC: NEGATIVE MG/DL — SIGNIFICANT CHANGE UP
RSV RNA NPH QL NAA+NON-PROBE: SIGNIFICANT CHANGE UP
SARS-COV-2 RNA SPEC QL NAA+PROBE: SIGNIFICANT CHANGE UP
SOURCE RESPIRATORY: SIGNIFICANT CHANGE UP
SP GR SPEC: 1 — SIGNIFICANT CHANGE UP (ref 1–1.03)
UROBILINOGEN FLD QL: 0.2 MG/DL — SIGNIFICANT CHANGE UP (ref 0.2–1)

## 2025-05-20 RX ADMIN — Medication 1000 MILLILITER(S): at 02:12

## 2025-05-21 LAB
CULTURE RESULTS: SIGNIFICANT CHANGE UP
SPECIMEN SOURCE: SIGNIFICANT CHANGE UP

## 2025-05-22 ENCOUNTER — APPOINTMENT (OUTPATIENT)
Dept: ORTHOPEDIC SURGERY | Facility: CLINIC | Age: 70
End: 2025-05-22
Payer: MEDICARE

## 2025-05-22 DIAGNOSIS — D62 ACUTE POSTHEMORRHAGIC ANEMIA: ICD-10-CM

## 2025-05-22 DIAGNOSIS — E43 UNSPECIFIED SEVERE PROTEIN-CALORIE MALNUTRITION: ICD-10-CM

## 2025-05-22 DIAGNOSIS — E78.5 HYPERLIPIDEMIA, UNSPECIFIED: ICD-10-CM

## 2025-05-22 DIAGNOSIS — K57.31 DIVERTICULOSIS OF LARGE INTESTINE WITHOUT PERFORATION OR ABSCESS WITH BLEEDING: ICD-10-CM

## 2025-05-22 DIAGNOSIS — M17.0 BILATERAL PRIMARY OSTEOARTHRITIS OF KNEE: ICD-10-CM

## 2025-05-22 DIAGNOSIS — F10.10 ALCOHOL ABUSE, UNCOMPLICATED: ICD-10-CM

## 2025-05-22 DIAGNOSIS — I10 ESSENTIAL (PRIMARY) HYPERTENSION: ICD-10-CM

## 2025-05-22 PROCEDURE — 99214 OFFICE O/P EST MOD 30 MIN: CPT | Mod: 25

## 2025-05-22 PROCEDURE — 73562 X-RAY EXAM OF KNEE 3: CPT | Mod: RT

## 2025-05-22 PROCEDURE — 20610 DRAIN/INJ JOINT/BURSA W/O US: CPT | Mod: RT

## 2025-05-22 RX ORDER — SODIUM HYALURONATE INTRA-ARTICULAR SOLN PREF SYR 25 MG/2.5ML 25/2.5 MG/ML
25 SOLUTION PREFILLED SYRINGE INTRAARTICULAR
Qty: 10 | Refills: 0 | Status: ACTIVE | OUTPATIENT
Start: 2025-05-22

## 2025-05-27 VITALS — HEIGHT: 73 IN | WEIGHT: 200 LBS | BODY MASS INDEX: 26.51 KG/M2

## 2025-06-30 PROBLEM — Z87.19 PERSONAL HISTORY OF OTHER DISEASES OF THE DIGESTIVE SYSTEM: Chronic | Status: ACTIVE | Noted: 2025-05-22

## 2025-07-02 ENCOUNTER — APPOINTMENT (OUTPATIENT)
Dept: ORTHOPEDIC SURGERY | Facility: CLINIC | Age: 70
End: 2025-07-02
Payer: MEDICARE

## 2025-07-02 PROCEDURE — 20610 DRAIN/INJ JOINT/BURSA W/O US: CPT | Mod: 50

## 2025-07-09 ENCOUNTER — APPOINTMENT (OUTPATIENT)
Dept: ORTHOPEDIC SURGERY | Facility: CLINIC | Age: 70
End: 2025-07-09
Payer: MEDICARE

## 2025-07-09 PROCEDURE — 20610 DRAIN/INJ JOINT/BURSA W/O US: CPT | Mod: 50

## 2025-07-18 ENCOUNTER — APPOINTMENT (OUTPATIENT)
Dept: ORTHOPEDIC SURGERY | Facility: CLINIC | Age: 70
End: 2025-07-18

## 2025-07-18 PROCEDURE — 20610 DRAIN/INJ JOINT/BURSA W/O US: CPT | Mod: 50

## 2025-07-20 ENCOUNTER — EMERGENCY (EMERGENCY)
Facility: HOSPITAL | Age: 70
LOS: 0 days | Discharge: HOME | End: 2025-07-20
Attending: EMERGENCY MEDICINE
Payer: MEDICARE

## 2025-07-20 VITALS
TEMPERATURE: 98 F | RESPIRATION RATE: 18 BRPM | HEART RATE: 78 BPM | DIASTOLIC BLOOD PRESSURE: 82 MMHG | OXYGEN SATURATION: 100 % | SYSTOLIC BLOOD PRESSURE: 154 MMHG

## 2025-07-20 VITALS — HEIGHT: 72 IN | WEIGHT: 190.04 LBS

## 2025-07-20 DIAGNOSIS — M70.21 OLECRANON BURSITIS, RIGHT ELBOW: ICD-10-CM

## 2025-07-20 DIAGNOSIS — R60.0 LOCALIZED EDEMA: ICD-10-CM

## 2025-07-20 DIAGNOSIS — M25.562 PAIN IN LEFT KNEE: ICD-10-CM

## 2025-07-20 DIAGNOSIS — E78.5 HYPERLIPIDEMIA, UNSPECIFIED: ICD-10-CM

## 2025-07-20 DIAGNOSIS — M10.9 GOUT, UNSPECIFIED: ICD-10-CM

## 2025-07-20 DIAGNOSIS — I10 ESSENTIAL (PRIMARY) HYPERTENSION: ICD-10-CM

## 2025-07-20 DIAGNOSIS — M79.672 PAIN IN LEFT FOOT: ICD-10-CM

## 2025-07-20 DIAGNOSIS — M25.521 PAIN IN RIGHT ELBOW: ICD-10-CM

## 2025-07-20 PROCEDURE — 73562 X-RAY EXAM OF KNEE 3: CPT | Mod: 26,LT

## 2025-07-20 PROCEDURE — 73080 X-RAY EXAM OF ELBOW: CPT | Mod: 26,RT

## 2025-07-20 PROCEDURE — 73630 X-RAY EXAM OF FOOT: CPT | Mod: LT

## 2025-07-20 PROCEDURE — 99284 EMERGENCY DEPT VISIT MOD MDM: CPT | Mod: 25

## 2025-07-20 PROCEDURE — 73630 X-RAY EXAM OF FOOT: CPT | Mod: 26,LT

## 2025-07-20 PROCEDURE — 73562 X-RAY EXAM OF KNEE 3: CPT | Mod: LT

## 2025-07-20 PROCEDURE — 96372 THER/PROPH/DIAG INJ SC/IM: CPT

## 2025-07-20 PROCEDURE — 99284 EMERGENCY DEPT VISIT MOD MDM: CPT | Mod: FS

## 2025-07-20 PROCEDURE — 73080 X-RAY EXAM OF ELBOW: CPT | Mod: RT

## 2025-07-20 RX ORDER — KETOROLAC TROMETHAMINE 30 MG/ML
30 INJECTION, SOLUTION INTRAMUSCULAR; INTRAVENOUS ONCE
Refills: 0 | Status: DISCONTINUED | OUTPATIENT
Start: 2025-07-20 | End: 2025-07-20

## 2025-07-20 RX ORDER — PREDNISONE 20 MG/1
2 TABLET ORAL
Qty: 10 | Refills: 0
Start: 2025-07-20 | End: 2025-07-24

## 2025-07-20 RX ORDER — OXYCODONE HYDROCHLORIDE 30 MG/1
1 TABLET ORAL
Qty: 12 | Refills: 0
Start: 2025-07-20 | End: 2025-07-22

## 2025-07-20 RX ORDER — IBUPROFEN 200 MG
1 TABLET ORAL
Qty: 40 | Refills: 0
Start: 2025-07-20

## 2025-07-20 RX ADMIN — KETOROLAC TROMETHAMINE 30 MILLIGRAM(S): 30 INJECTION, SOLUTION INTRAMUSCULAR; INTRAVENOUS at 15:43

## 2025-07-20 NOTE — ED ADULT TRIAGE NOTE - CHIEF COMPLAINT QUOTE
Patient presents to the ER with complaints of painful joints, left foot pain, unknown injury, right elbow pain, swollen painful knees (s/p "gel injections" by ortho.)

## 2025-07-20 NOTE — ED ADULT NURSE NOTE - OBJECTIVE STATEMENT
Pt presents to ER c/o joint pain; right elbow, left knee and left foot. Onset of symptoms began a few days ago. Pt reports hx of gout. Denies injury/fall. AO x 3 oriented to baseline, normal breathing pattern with no difficulty. Gait steady

## 2025-07-20 NOTE — ED PROVIDER NOTE - CARE PROVIDER_API CALL
Ksenia Delaney)  Orthopaedic Surgery  [address] [place] [id number]  Phone: [phone]  Fax: [phone]  Follow Up Time:

## 2025-07-20 NOTE — ED PROVIDER NOTE - ATTENDING APP SHARED VISIT CONTRIBUTION OF CARE
I, Dr. Mary York DO, personally saw the patient with ROSEY.  I have personally performed a face to face diagnostic evaluation on this patient.  I have reviewed the ROSEY note and agree with the history, exam, and plan of care, except as noted.  I personally saw the patient and performed a substantive portion of the visit including all aspects of the medical decision making.  CYN Paul DO

## 2025-07-20 NOTE — ED PROVIDER NOTE - NS ED ROS FT
GEN: Denies fever, chills, sick contacts, recent travel  HEENT: Denies dizziness, blurry vision, HA  Cardiac: Denies CP, SOB, palpitations  Lungs: Denies cough, wheezing  PV: Denies LE swelling  GI: Denies nausea, vomiting, diarrhea, constipation, flank pain  : Denies hematuria, dysuria, frequency, urgency  Skin: Denies rash, redness, open wound  MSK: + pain, decreased ROM  Neuro: Denies dizziness, difficulty speaking, difficulty swallowing, numbness/tingling in extremities, loss of bowel/bladder control, weakness in extremities

## 2025-07-20 NOTE — ED PROVIDER NOTE - OBJECTIVE STATEMENT
68 y/o M with PMh of HTN, HLD, gout, ETOH abuse presents with pain to right elbow, left knee and left foot over the past few days. Denies fever, chills. States he's had similar pain before which has been attributed to gout. Denies trauma. admits to drinking alcohol and eating steak prior to onset of symptoms.

## 2025-07-20 NOTE — ED ADULT NURSE NOTE - PAIN: PRESENCE, MLM
Detail Level: Detailed Detail Level: Simple Erythromycin Counseling:  I discussed with the patient the risks of erythromycin including but not limited to GI upset, allergic reaction, drug rash, diarrhea, increase in liver enzymes, and yeast infections. Azelaic Acid Counseling: Patient counseled that medicine may cause skin irritation and to avoid applying near the eyes.  In the event of skin irritation, the patient was advised to reduce the amount of the drug applied or use it less frequently.   The patient verbalized understanding of the proper use and possible adverse effects of azelaic acid.  All of the patient's questions and concerns were addressed. Bactrim Pregnancy And Lactation Text: This medication is Pregnancy Category D and is known to cause fetal risk.  It is also excreted in breast milk. Tazorac Pregnancy And Lactation Text: This medication is not safe during pregnancy. It is unknown if this medication is excreted in breast milk. Minocycline Pregnancy And Lactation Text: This medication is Pregnancy Category D and not consider safe during pregnancy. It is also excreted in breast milk. Aklief counseling:  Patient advised to apply a pea-sized amount only at bedtime and wait 30 minutes after washing their face before applying.  If too drying, patient may add a non-comedogenic moisturizer.  The most commonly reported side effects including irritation, redness, scaling, dryness, stinging, burning, itching, and increased risk of sunburn.  The patient verbalized understanding of the proper use and possible adverse effects of retinoids.  All of the patient's questions and concerns were addressed. High Dose Vitamin A Pregnancy And Lactation Text: High dose vitamin A therapy is contraindicated during pregnancy and breast feeding. Doxycycline Counseling:  Patient counseled regarding possible photosensitivity and increased risk for sunburn.  Patient instructed to avoid sunlight, if possible.  When exposed to sunlight, patients should wear protective clothing, sunglasses, and sunscreen.  The patient was instructed to call the office immediately if the following severe adverse effects occur:  hearing changes, easy bruising/bleeding, severe headache, or vision changes.  The patient verbalized understanding of the proper use and possible adverse effects of doxycycline.  All of the patient's questions and concerns were addressed. Azithromycin Pregnancy And Lactation Text: This medication is considered safe during pregnancy and is also secreted in breast milk. Topical Retinoid Pregnancy And Lactation Text: This medication is Pregnancy Category C. It is unknown if this medication is excreted in breast milk. Winlevi Counseling:  I discussed with the patient the risks of topical clascoterone including but not limited to erythema, scaling, itching, and stinging. Patient voiced their understanding. Tetracycline Counseling: Patient counseled regarding possible photosensitivity and increased risk for sunburn.  Patient instructed to avoid sunlight, if possible.  When exposed to sunlight, patients should wear protective clothing, sunglasses, and sunscreen.  The patient was instructed to call the office immediately if the following severe adverse effects occur:  hearing changes, easy bruising/bleeding, severe headache, or vision changes.  The patient verbalized understanding of the proper use and possible adverse effects of tetracycline.  All of the patient's questions and concerns were addressed. Patient understands to avoid pregnancy while on therapy due to potential birth defects. Benzoyl Peroxide Pregnancy And Lactation Text: This medication is Pregnancy Category C. It is unknown if benzoyl peroxide is excreted in breast milk. Isotretinoin Pregnancy And Lactation Text: This medication is Pregnancy Category X and is considered extremely dangerous during pregnancy. It is unknown if it is excreted in breast milk. Topical Sulfur Applications Counseling: Topical Sulfur Counseling: Patient counseled that this medication may cause skin irritation or allergic reactions.  In the event of skin irritation, the patient was advised to reduce the amount of the drug applied or use it less frequently.   The patient verbalized understanding of the proper use and possible adverse effects of topical sulfur application.  All of the patient's questions and concerns were addressed. Dapsone Counseling: I discussed with the patient the risks of dapsone including but not limited to hemolytic anemia, agranulocytosis, rashes, methemoglobinemia, kidney failure, peripheral neuropathy, headaches, GI upset, and liver toxicity.  Patients who start dapsone require monitoring including baseline LFTs and weekly CBCs for the first month, then every month thereafter.  The patient verbalized understanding of the proper use and possible adverse effects of dapsone.  All of the patient's questions and concerns were addressed. Spironolactone Counseling: Patient advised regarding risks of diarrhea, abdominal pain, hyperkalemia, birth defects (for female patients), liver toxicity and renal toxicity. The patient may need blood work to monitor liver and kidney function and potassium levels while on therapy. The patient verbalized understanding of the proper use and possible adverse effects of spironolactone.  All of the patient's questions and concerns were addressed. Birth Control Pills Counseling: Birth Control Pill Counseling: I discussed with the patient the potential side effects of OCPs including but not limited to increased risk of stroke, heart attack, thrombophlebitis, deep venous thrombosis, hepatic adenomas, breast changes, GI upset, headaches, and depression.  The patient verbalized understanding of the proper use and possible adverse effects of OCPs. All of the patient's questions and concerns were addressed. Azelaic Acid Pregnancy And Lactation Text: This medication is considered safe during pregnancy and breast feeding. complains of pain/discomfort Topical Clindamycin Counseling: Patient counseled that this medication may cause skin irritation or allergic reactions.  In the event of skin irritation, the patient was advised to reduce the amount of the drug applied or use it less frequently.   The patient verbalized understanding of the proper use and possible adverse effects of clindamycin.  All of the patient's questions and concerns were addressed. Erythromycin Pregnancy And Lactation Text: This medication is Pregnancy Category B and is considered safe during pregnancy. It is also excreted in breast milk. Sarecycline Counseling: Patient advised regarding possible photosensitivity and discoloration of the teeth, skin, lips, tongue and gums.  Patient instructed to avoid sunlight, if possible.  When exposed to sunlight, patients should wear protective clothing, sunglasses, and sunscreen.  The patient was instructed to call the office immediately if the following severe adverse effects occur:  hearing changes, easy bruising/bleeding, severe headache, or vision changes.  The patient verbalized understanding of the proper use and possible adverse effects of sarecycline.  All of the patient's questions and concerns were addressed. Aklief Pregnancy And Lactation Text: It is unknown if this medication is safe to use during pregnancy.  It is unknown if this medication is excreted in breast milk.  Breastfeeding women should use the topical cream on the smallest area of the skin for the shortest time needed while breastfeeding.  Do not apply to nipple and areola. Doxycycline Pregnancy And Lactation Text: This medication is Pregnancy Category D and not consider safe during pregnancy. It is also excreted in breast milk but is considered safe for shorter treatment courses. Bactrim Counseling:  I discussed with the patient the risks of sulfa antibiotics including but not limited to GI upset, allergic reaction, drug rash, diarrhea, dizziness, photosensitivity, and yeast infections.  Rarely, more serious reactions can occur including but not limited to aplastic anemia, agranulocytosis, methemoglobinemia, blood dyscrasias, liver or kidney failure, lung infiltrates or desquamative/blistering drug rashes. Tazorac Counseling:  Patient advised that medication is irritating and drying.  Patient may need to apply sparingly and wash off after an hour before eventually leaving it on overnight.  The patient verbalized understanding of the proper use and possible adverse effects of tazorac.  All of the patient's questions and concerns were addressed. Winlevi Pregnancy And Lactation Text: This medication is considered safe during pregnancy and breastfeeding. Azithromycin Counseling:  I discussed with the patient the risks of azithromycin including but not limited to GI upset, allergic reaction, drug rash, diarrhea, and yeast infections. Use Enhanced Medication Counseling?: No Minocycline Counseling: Patient advised regarding possible photosensitivity and discoloration of the teeth, skin, lips, tongue and gums.  Patient instructed to avoid sunlight, if possible.  When exposed to sunlight, patients should wear protective clothing, sunglasses, and sunscreen.  The patient was instructed to call the office immediately if the following severe adverse effects occur:  hearing changes, easy bruising/bleeding, severe headache, or vision changes.  The patient verbalized understanding of the proper use and possible adverse effects of minocycline.  All of the patient's questions and concerns were addressed. Topical Retinoid counseling:  Patient advised to apply a pea-sized amount only at bedtime and wait 30 minutes after washing their face before applying.  If too drying, patient may add a non-comedogenic moisturizer. The patient verbalized understanding of the proper use and possible adverse effects of retinoids.  All of the patient's questions and concerns were addressed. Topical Sulfur Applications Pregnancy And Lactation Text: This medication is Pregnancy Category C and has an unknown safety profile during pregnancy. It is unknown if this topical medication is excreted in breast milk. Dapsone Pregnancy And Lactation Text: This medication is Pregnancy Category C and is not considered safe during pregnancy or breast feeding. High Dose Vitamin A Counseling: Side effects reviewed, pt to contact office should one occur. Benzoyl Peroxide Counseling: Patient counseled that medicine may cause skin irritation and bleach clothing.  In the event of skin irritation, the patient was advised to reduce the amount of the drug applied or use it less frequently.   The patient verbalized understanding of the proper use and possible adverse effects of benzoyl peroxide.  All of the patient's questions and concerns were addressed. Birth Control Pills Pregnancy And Lactation Text: This medication should be avoided if pregnant and for the first 30 days post-partum. Spironolactone Pregnancy And Lactation Text: This medication can cause feminization of the male fetus and should be avoided during pregnancy. The active metabolite is also found in breast milk. Isotretinoin Counseling: Patient should get monthly blood tests, not donate blood, not drive at night if vision affected, not share medication, and not undergo elective surgery for 6 months after tx completed. Side effects reviewed, pt to contact office should one occur. Topical Clindamycin Pregnancy And Lactation Text: This medication is Pregnancy Category B and is considered safe during pregnancy. It is unknown if it is excreted in breast milk.

## 2025-07-20 NOTE — ED ADULT NURSE NOTE - IN THE PAST 12 MONTHS HAVE YOU USED DRUGS OTHER THAN THOSE REQUIRED FOR MEDICAL REASON?
chronic, stable  -Hold PPI  -Will hold oral medications until patient able to safely swallow as daughters report decreased appetite and difficulty swallowing/choking  -NPO until speech and swallow evaluation No

## 2025-07-20 NOTE — ED PROVIDER NOTE - PATIENT PORTAL LINK FT
You can access the FollowMyHealth Patient Portal offered by [facility] by registering at the following website: [email or web address]. By joining [employer]’s FollowMyHealth portal, you will also be able to view your health information using other applications (apps) compatible with our system.

## 2025-07-20 NOTE — ED ADULT NURSE NOTE - NSFALLUNIVINTERV_ED_ALL_ED
Bed/Stretcher in lowest position, wheels locked, appropriate side rails in place/Call bell, personal items and telephone in reach/Instruct patient to call for assistance before getting out of bed/chair/stretcher/Non-slip footwear applied when patient is off stretcher/[place] to call system/Physically safe environment - no spills, clutter or unnecessary equipment/Purposeful proactive rounding/Room/bathroom lighting operational, light cord in reach

## 2025-07-20 NOTE — ED PROVIDER NOTE - PROGRESS NOTE DETAILS
XRs negative, will treat as gout flare. WIll dc with steroids, analgesia, instructions for low purine diet, orthopedic referral. - Pawan Rogers PA-C

## 2025-07-20 NOTE — ED PROVIDER NOTE - PHYSICAL EXAMINATION
Gen: Well appearing. A&O x3.   HEENT: NC/AT. Dentition in good repair. No oropharyngeal erythema, tonsilar enlargement or exudates. Uvula midline. No submandibular or anterior cervical lymphadenopathy. TM well visualized bilaterally. Nares patent.  Cardiac: s1s2, RRR.  Lungs: CTAB, no chest wall tenderness.  Abdomen: NBS x4, soft, nontender. No CVAT.  MSK: No obvious deformity to extremities. +erythema and edema without fluctuance or induration over right olecranon. Left knee edema without erythema. trace patch of erythema over 1st metatarsal left foot. +TTP over right olecranon and left foot. Full ROM all UE and lE joints.  Neuro: CN II-XII intact. Sensation intact to light touch in all extremities. 5/5 strength in all extremities.  PV: No LE edema or calf tenderness. Distal pulses 2+ in all extremities.

## 2025-07-24 ENCOUNTER — APPOINTMENT (OUTPATIENT)
Dept: ORTHOPEDIC SURGERY | Facility: CLINIC | Age: 70
End: 2025-07-24
Payer: MEDICARE

## 2025-07-24 PROCEDURE — 20610 DRAIN/INJ JOINT/BURSA W/O US: CPT | Mod: 50

## 2025-07-31 ENCOUNTER — APPOINTMENT (OUTPATIENT)
Dept: ORTHOPEDIC SURGERY | Facility: CLINIC | Age: 70
End: 2025-07-31
Payer: MEDICARE

## 2025-07-31 DIAGNOSIS — M17.0 BILATERAL PRIMARY OSTEOARTHRITIS OF KNEE: ICD-10-CM

## 2025-07-31 PROCEDURE — 20610 DRAIN/INJ JOINT/BURSA W/O US: CPT | Mod: 50

## 2025-09-11 ENCOUNTER — APPOINTMENT (OUTPATIENT)
Dept: ORTHOPEDIC SURGERY | Facility: CLINIC | Age: 70
End: 2025-09-11

## 2025-09-11 VITALS — BODY MASS INDEX: 26.51 KG/M2 | HEIGHT: 73 IN | WEIGHT: 200 LBS

## 2025-09-11 DIAGNOSIS — M17.0 BILATERAL PRIMARY OSTEOARTHRITIS OF KNEE: ICD-10-CM

## 2025-09-11 DIAGNOSIS — M17.12 UNILATERAL PRIMARY OSTEOARTHRITIS, LEFT KNEE: ICD-10-CM

## 2025-09-11 PROCEDURE — 99214 OFFICE O/P EST MOD 30 MIN: CPT

## 2025-09-15 VITALS — WEIGHT: 200 LBS | BODY MASS INDEX: 26.51 KG/M2 | HEIGHT: 73 IN
